# Patient Record
Sex: FEMALE | Race: WHITE | NOT HISPANIC OR LATINO | URBAN - METROPOLITAN AREA
[De-identification: names, ages, dates, MRNs, and addresses within clinical notes are randomized per-mention and may not be internally consistent; named-entity substitution may affect disease eponyms.]

---

## 2023-04-12 DIAGNOSIS — Z00.00 HEALTH MAINTENANCE EXAMINATION: ICD-10-CM

## 2023-04-12 DIAGNOSIS — E78.5 HYPERLIPIDEMIA, UNSPECIFIED: ICD-10-CM

## 2023-04-12 RX ORDER — EZETIMIBE 10 MG/1
TABLET ORAL
Qty: 90 TABLET | Refills: 3 | Status: SHIPPED | OUTPATIENT
Start: 2023-04-12 | End: 2023-12-25

## 2023-05-29 DIAGNOSIS — E78.5 HYPERLIPIDEMIA, UNSPECIFIED: ICD-10-CM

## 2023-05-29 RX ORDER — ATORVASTATIN CALCIUM 80 MG/1
TABLET, FILM COATED ORAL
Qty: 90 TABLET | Refills: 1 | Status: SHIPPED | OUTPATIENT
Start: 2023-05-29 | End: 2023-11-25

## 2023-06-28 ENCOUNTER — LAB (OUTPATIENT)
Dept: LAB | Facility: LAB | Age: 58
End: 2023-06-28
Payer: COMMERCIAL

## 2023-06-28 DIAGNOSIS — Z00.00 HEALTH MAINTENANCE EXAMINATION: ICD-10-CM

## 2023-06-28 LAB
ALANINE AMINOTRANSFERASE (SGPT) (U/L) IN SER/PLAS: 13 U/L (ref 7–45)
ALBUMIN (G/DL) IN SER/PLAS: 4.2 G/DL (ref 3.4–5)
ALKALINE PHOSPHATASE (U/L) IN SER/PLAS: 54 U/L (ref 33–110)
ANION GAP IN SER/PLAS: 14 MMOL/L (ref 10–20)
ASPARTATE AMINOTRANSFERASE (SGOT) (U/L) IN SER/PLAS: 16 U/L (ref 9–39)
BACTERIA, URINE: ABNORMAL /HPF
BASOPHILS (10*3/UL) IN BLOOD BY AUTOMATED COUNT: 0.05 X10E9/L (ref 0–0.1)
BASOPHILS/100 LEUKOCYTES IN BLOOD BY AUTOMATED COUNT: 0.8 % (ref 0–2)
BILIRUBIN TOTAL (MG/DL) IN SER/PLAS: 1.8 MG/DL (ref 0–1.2)
C REACTIVE PROTEIN (MG/L) IN SER/PLAS BY HIGH SENSIT: 3.2 MG/L
CALCIDIOL (25 OH VITAMIN D3) (NG/ML) IN SER/PLAS: 61 NG/ML
CALCIUM (MG/DL) IN SER/PLAS: 9.3 MG/DL (ref 8.6–10.6)
CARBON DIOXIDE, TOTAL (MMOL/L) IN SER/PLAS: 28 MMOL/L (ref 21–32)
CHLORIDE (MMOL/L) IN SER/PLAS: 104 MMOL/L (ref 98–107)
CHOLESTEROL (MG/DL) IN SER/PLAS: 109 MG/DL (ref 0–199)
CHOLESTEROL IN HDL (MG/DL) IN SER/PLAS: 46.3 MG/DL
CHOLESTEROL/HDL RATIO: 2.4
CREATININE (MG/DL) IN SER/PLAS: 0.61 MG/DL (ref 0.5–1.05)
EOSINOPHILS (10*3/UL) IN BLOOD BY AUTOMATED COUNT: 0.1 X10E9/L (ref 0–0.7)
EOSINOPHILS/100 LEUKOCYTES IN BLOOD BY AUTOMATED COUNT: 1.6 % (ref 0–6)
ERYTHROCYTE DISTRIBUTION WIDTH (RATIO) BY AUTOMATED COUNT: 14 % (ref 11.5–14.5)
ERYTHROCYTE MEAN CORPUSCULAR HEMOGLOBIN CONCENTRATION (G/DL) BY AUTOMATED: 31.8 G/DL (ref 32–36)
ERYTHROCYTE MEAN CORPUSCULAR VOLUME (FL) BY AUTOMATED COUNT: 87 FL (ref 80–100)
ERYTHROCYTES (10*6/UL) IN BLOOD BY AUTOMATED COUNT: 4.31 X10E12/L (ref 4–5.2)
ESTIMATED AVERAGE GLUCOSE FOR HBA1C: 120 MG/DL
GFR FEMALE: >90 ML/MIN/1.73M2
GLUCOSE (MG/DL) IN SER/PLAS: 99 MG/DL (ref 74–99)
HEMATOCRIT (%) IN BLOOD BY AUTOMATED COUNT: 37.4 % (ref 36–46)
HEMOGLOBIN (G/DL) IN BLOOD: 11.9 G/DL (ref 12–16)
HEMOGLOBIN A1C/HEMOGLOBIN TOTAL IN BLOOD: 5.8 %
IMMATURE GRANULOCYTES/100 LEUKOCYTES IN BLOOD BY AUTOMATED COUNT: 0.3 % (ref 0–0.9)
LDL: 47 MG/DL (ref 0–99)
LEUKOCYTES (10*3/UL) IN BLOOD BY AUTOMATED COUNT: 6.3 X10E9/L (ref 4.4–11.3)
LYMPHOCYTES (10*3/UL) IN BLOOD BY AUTOMATED COUNT: 2.02 X10E9/L (ref 1.2–4.8)
LYMPHOCYTES/100 LEUKOCYTES IN BLOOD BY AUTOMATED COUNT: 32.1 % (ref 13–44)
MONOCYTES (10*3/UL) IN BLOOD BY AUTOMATED COUNT: 0.35 X10E9/L (ref 0.1–1)
MONOCYTES/100 LEUKOCYTES IN BLOOD BY AUTOMATED COUNT: 5.6 % (ref 2–10)
NEUTROPHILS (10*3/UL) IN BLOOD BY AUTOMATED COUNT: 3.76 X10E9/L (ref 1.2–7.7)
NEUTROPHILS/100 LEUKOCYTES IN BLOOD BY AUTOMATED COUNT: 59.6 % (ref 40–80)
NRBC (PER 100 WBCS) BY AUTOMATED COUNT: 0 /100 WBC (ref 0–0)
PLATELETS (10*3/UL) IN BLOOD AUTOMATED COUNT: 338 X10E9/L (ref 150–450)
POTASSIUM (MMOL/L) IN SER/PLAS: 4.5 MMOL/L (ref 3.5–5.3)
PROTEIN TOTAL: 6.7 G/DL (ref 6.4–8.2)
RBC, URINE: <1 /HPF (ref 0–5)
SODIUM (MMOL/L) IN SER/PLAS: 141 MMOL/L (ref 136–145)
SQUAMOUS EPITHELIAL CELLS, URINE: 1 /HPF
THYROTROPIN (MIU/L) IN SER/PLAS BY DETECTION LIMIT <= 0.05 MIU/L: 2.03 MIU/L (ref 0.44–3.98)
TRIGLYCERIDE (MG/DL) IN SER/PLAS: 81 MG/DL (ref 0–149)
UREA NITROGEN (MG/DL) IN SER/PLAS: 12 MG/DL (ref 6–23)
VLDL: 16 MG/DL (ref 0–40)
WBC, URINE: <1 /HPF (ref 0–5)

## 2023-06-28 PROCEDURE — 36415 COLL VENOUS BLD VENIPUNCTURE: CPT

## 2023-06-28 PROCEDURE — 84443 ASSAY THYROID STIM HORMONE: CPT

## 2023-06-28 PROCEDURE — 80061 LIPID PANEL: CPT

## 2023-06-28 PROCEDURE — 80053 COMPREHEN METABOLIC PANEL: CPT

## 2023-06-28 PROCEDURE — 81001 URINALYSIS AUTO W/SCOPE: CPT

## 2023-06-28 PROCEDURE — 83036 HEMOGLOBIN GLYCOSYLATED A1C: CPT

## 2023-06-28 PROCEDURE — 86141 C-REACTIVE PROTEIN HS: CPT

## 2023-06-28 PROCEDURE — 85025 COMPLETE CBC W/AUTO DIFF WBC: CPT

## 2023-06-28 PROCEDURE — 82306 VITAMIN D 25 HYDROXY: CPT

## 2023-07-11 PROBLEM — N95.2 VAGINAL ATROPHY: Status: RESOLVED | Noted: 2023-07-11 | Resolved: 2023-07-11

## 2023-07-11 PROBLEM — N39.3 STRESS INCONTINENCE: Status: RESOLVED | Noted: 2023-07-11 | Resolved: 2023-07-11

## 2023-07-11 PROBLEM — G47.00 INSOMNIA: Status: ACTIVE | Noted: 2023-07-11

## 2023-07-11 PROBLEM — E78.5 HYPERLIPIDEMIA: Status: ACTIVE | Noted: 2023-07-11

## 2023-07-11 PROBLEM — Z00.00 ANNUAL PHYSICAL EXAM: Status: ACTIVE | Noted: 2023-07-11

## 2023-07-11 PROBLEM — R92.1 BREAST CALCIFICATION SEEN ON MAMMOGRAM: Status: RESOLVED | Noted: 2023-07-11 | Resolved: 2023-07-11

## 2023-07-11 PROBLEM — M85.80 OSTEOPENIA: Status: ACTIVE | Noted: 2023-07-11

## 2023-07-11 PROBLEM — N81.10 PROLAPSE OF ANTERIOR VAGINAL WALL: Status: RESOLVED | Noted: 2023-07-11 | Resolved: 2023-07-11

## 2023-07-11 RX ORDER — ELETRIPTAN HYDROBROMIDE 20 MG/1
20 TABLET, FILM COATED ORAL
COMMUNITY
Start: 2019-07-27

## 2023-07-11 RX ORDER — ACETAMINOPHEN 500 MG
1 TABLET ORAL DAILY
COMMUNITY

## 2023-07-11 RX ORDER — DICLOFENAC SODIUM 20 MG/G
SOLUTION TOPICAL
COMMUNITY
Start: 2017-11-02 | End: 2023-07-12 | Stop reason: ALTCHOICE

## 2023-07-11 RX ORDER — ESTRADIOL 0.04 MG/D
1 PATCH TRANSDERMAL
COMMUNITY
End: 2023-12-29

## 2023-07-11 RX ORDER — ASPIRIN 325 MG
TABLET ORAL
COMMUNITY
End: 2023-10-30

## 2023-07-11 RX ORDER — LIFITEGRAST 50 MG/ML
1 SOLUTION/ DROPS OPHTHALMIC 2 TIMES DAILY
COMMUNITY
Start: 2023-06-09 | End: 2023-12-16

## 2023-07-11 RX ORDER — ZOLPIDEM TARTRATE 5 MG/1
1 TABLET ORAL NIGHTLY PRN
COMMUNITY
Start: 2016-11-21

## 2023-07-11 NOTE — PROGRESS NOTES
Physical Exam    Name Cristiana Franz    Date of Service :7/12/2023    Cristiana Franz is a 58 y.o. year old female who is being seen for a comprehensive exam    She Has a medical history of  hypercholesteremia with elevated CT Calcium score 222.65. 6/29/2020 on Zetia, Lipitor 80 mg, asa , h/o Migraines-( was mostly Hormonal related. She hasn't had one in a while. relpax is very effective if needed )  osteopenia.   She has been on the  Estradiol patch since age 53.  s/p CHARLOTTE for uterine prolapse at age 53 doing well on estrogen also had bladder lift for severe bladder prolapse at that time. complicated by obstruction from a suture. needed second surgery  and  insomnia- uses Ambien twice weekly with good results     Her main health concerns today are .  Saw Dr. Child last month for evaluation and some of the lift has failed.    Saw Dr. Dillard for a lipoma removal on her right side.   Insomnia-  try's not to take her Ambien. Would like to try something  Hearing is not great. She is missing words. Noisier environment.    She has gained some weight. She is up 10 lbs.      Patient Active Problem List   Diagnosis    Calcification of coronary artery    Hyperlipidemia    Insomnia    Osteopenia    Annual physical exam    Decreased hearing    Pre-diabetes        Past Medical History:   Diagnosis Date    Bladder prolapse, female, acquired     Cystocele, unspecified     Migraine, unspecified, not intractable, without status migrainosus     Prolapse of anterior vaginal wall     Stress incontinence     Uterovaginal prolapse, unspecified     Vaginal atrophy         Past Surgical History:   Procedure Laterality Date    OTHER SURGICAL HISTORY  03/03/2015    Hysteroscopy W/ Insert Of Device To Occlude Fallopian Tubes    OTHER SURGICAL HISTORY  03/19/2019    Hysterectomy supracervical        Social History     Tobacco Use    Smoking status: Never    Smokeless tobacco: Never      Social History     Social History Narrative    , mother  "of 3, works as a homemaker, lives in [place], [place].     Active, walks regularly tries to do the 10,000 steps a day. life long nonsmoker, social ETOH     two daughters and a son     Alcohol: rarely   Diet: balanced.    Exercise: walks, and does pure.     Family History   Problem Relation Name Age of Onset    Coronary artery disease Mother          d. 75    Kidney failure Mother      Coronary artery disease Father  60 - 69        d. 87    Parkinsonism Father      Hypertension Sister      Skin cancer Sister      No Known Problems Sister      Other (brain turmor) Father's Brother      COPD Maternal Grandmother          d. 82    Stroke Maternal Grandfather  60 - 69    No Known Problems Other          Current Outpatient Medications:     eletriptan (Relpax) 20 mg tablet, Take 1 tablet (20 mg) by mouth., Disp: , Rfl:     Xiidra 5 % dropperette, 1 drop twice a day., Disp: , Rfl:     zolpidem (Ambien) 5 mg tablet, Take 1 tablet (5 mg) by mouth as needed at bedtime., Disp: , Rfl:     aspirin 325 mg tablet, Take by mouth., Disp: , Rfl:     atorvastatin (Lipitor) 80 mg tablet, TAKE 1 TABLET BY MOUTH EVERY DAY, Disp: 90 tablet, Rfl: 1    cholecalciferol (Vitamin D-3) 50 mcg (2,000 unit) capsule, Take 1 capsule (50 mcg) by mouth once daily., Disp: , Rfl:     estradiol (Climara) 0.0375 mg/24 hr patch, Place 1 patch on the skin 1 (one) time per week. apply as directed, Disp: , Rfl:     ezetimibe (Zetia) 10 mg tablet, TAKE 1 TABLET BY MOUTH EVERY DAY, Disp: 90 tablet, Rfl: 3    metFORMIN (Glucophage) 500 mg tablet, Take 1 tablet (500 mg) by mouth 2 times a day with meals., Disp: 60 tablet, Rfl: 1    traZODone (Desyrel) 50 mg tablet, Take 1 tablet (50 mg) by mouth as needed at bedtime for sleep., Disp: 30 tablet, Rfl: 0    Allergies   Allergen Reactions    Penicillins Itching, Rash and Unknown     /72 (BP Location: Left arm, Patient Position: Sitting)   Pulse 72   Temp 36.7 °C (98.1 °F)   Ht 1.575 m (5' 2\")   Wt 67.1 kg " (148 lb)   SpO2 95%   BMI 27.07 kg/m²  Body mass index is 27.07 kg/m².    Physical Exam  Constitutional:       General: She is not in acute distress.     Appearance: Normal appearance. She is not ill-appearing.   HENT:      Right Ear: Tympanic membrane and ear canal normal.      Left Ear: Tympanic membrane and ear canal normal.   Eyes:      Extraocular Movements: Extraocular movements intact.      Conjunctiva/sclera: Conjunctivae normal.   Cardiovascular:      Rate and Rhythm: Normal rate.      Pulses: Normal pulses.      Heart sounds: Normal heart sounds. No murmur heard.  Pulmonary:      Effort: Pulmonary effort is normal. No respiratory distress.      Breath sounds: Normal breath sounds. No wheezing, rhonchi or rales.   Chest:   Breasts:     Right: Normal. No mass, nipple discharge or skin change.      Left: No mass, nipple discharge or skin change.   Abdominal:      General: Bowel sounds are normal. There is no distension.      Palpations: There is no mass.      Tenderness: There is no abdominal tenderness. There is no guarding.   Musculoskeletal:         General: Normal range of motion.   Lymphadenopathy:      Cervical: No cervical adenopathy.      Upper Body:      Right upper body: No supraclavicular or axillary adenopathy.      Left upper body: No supraclavicular or axillary adenopathy.      Lower Body: No right inguinal adenopathy. No left inguinal adenopathy.   Skin:     General: Skin is warm and dry.      Findings: No rash.      Comments: No suspicious rashes or lesions  2 lipomas on her back  Right upper 4.5 x4 cm  Right lower 7 cm x 7.5 cm    Neurological:      General: No focal deficit present.      Mental Status: She is alert.   Psychiatric:         Mood and Affect: Mood normal.         RESULTS/DATA:  Reviewed Standard Labs for this physical with patient ( any significant issues addressed in A/P )     ECG: NSR with RSR in V1 no change from prior       Assessment/Plan    see patient summary      Problem List Items Addressed This Visit       Hyperlipidemia     At goal   Continue Lipitor, Zeita and ASA daily         Insomnia    Relevant Medications    traZODone (Desyrel) 50 mg tablet    Osteopenia    Relevant Orders    XR DEXA bone density    Annual physical exam - Primary     Routine  Pap- 3/19/2019 negative, HPV negative. next pap due 2024. cervix still present   colonoscopy 2/25/2016 repeat 10 years 2/25/2026  DEXA: 12/14/21 lowest T score -1.2 left femoral neck  mammogram- 7/25/22  right breast calcifications.  6 month follow up in Jan. 2023  persistent. Recommend another 6 month follow up         Decreased hearing    Relevant Orders    Referral to Audiology    Pre-diabetes    Relevant Medications    metFORMIN (Glucophage) 500 mg tablet     Other Visit Diagnoses       Breast calcification, right        Relevant Orders    BI mammo bilateral diagnostic    Breast screening        Relevant Orders    BI mammo bilateral diagnostic            ROUTINE:     She will follow up with Dr. Child in November for Pap and to discuss weaning off HRT  OPHTHALMOLOGY:current   DERMATOLOGY: Dr. Gill  DENTISTRY: current       Reina Yo MD

## 2023-07-11 NOTE — ASSESSMENT & PLAN NOTE
Routine  Pap- 3/19/2019 negative, HPV negative. next pap due 2024. cervix still present   colonoscopy 2/25/2016 repeat 10 years 2/25/2026  DEXA: 12/14/21 lowest T score -1.2 left femoral neck  mammogram- 7/25/22  right breast calcifications.  6 month follow up in Jan. 2023  persistent. Recommend another 6 month follow up

## 2023-07-12 ENCOUNTER — OFFICE VISIT (OUTPATIENT)
Dept: PRIMARY CARE | Facility: CLINIC | Age: 58
End: 2023-07-12
Payer: COMMERCIAL

## 2023-07-12 VITALS
HEART RATE: 72 BPM | OXYGEN SATURATION: 95 % | SYSTOLIC BLOOD PRESSURE: 106 MMHG | WEIGHT: 148 LBS | HEIGHT: 62 IN | TEMPERATURE: 98.1 F | DIASTOLIC BLOOD PRESSURE: 72 MMHG | BODY MASS INDEX: 27.23 KG/M2

## 2023-07-12 DIAGNOSIS — R73.03 PRE-DIABETES: ICD-10-CM

## 2023-07-12 DIAGNOSIS — E78.5 HYPERLIPIDEMIA, UNSPECIFIED HYPERLIPIDEMIA TYPE: ICD-10-CM

## 2023-07-12 DIAGNOSIS — F51.01 PRIMARY INSOMNIA: ICD-10-CM

## 2023-07-12 DIAGNOSIS — Z00.00 ANNUAL PHYSICAL EXAM: Primary | ICD-10-CM

## 2023-07-12 DIAGNOSIS — R92.8 OTHER ABNORMAL AND INCONCLUSIVE FINDINGS ON DIAGNOSTIC IMAGING OF BREAST: ICD-10-CM

## 2023-07-12 DIAGNOSIS — R92.1 BREAST CALCIFICATION, RIGHT: ICD-10-CM

## 2023-07-12 DIAGNOSIS — Z12.39 BREAST SCREENING: ICD-10-CM

## 2023-07-12 DIAGNOSIS — M85.852 OSTEOPENIA OF LEFT HIP: ICD-10-CM

## 2023-07-12 DIAGNOSIS — H91.90 DECREASED HEARING, UNSPECIFIED LATERALITY: ICD-10-CM

## 2023-07-12 PROCEDURE — UHSPHYS PR UH SELECT PHYSICAL: Performed by: INTERNAL MEDICINE

## 2023-07-12 PROCEDURE — 1036F TOBACCO NON-USER: CPT | Performed by: INTERNAL MEDICINE

## 2023-07-12 PROCEDURE — 93000 ELECTROCARDIOGRAM COMPLETE: CPT | Performed by: INTERNAL MEDICINE

## 2023-07-12 RX ORDER — TRAZODONE HYDROCHLORIDE 50 MG/1
50 TABLET ORAL NIGHTLY PRN
Qty: 30 TABLET | Refills: 0 | Status: SHIPPED | OUTPATIENT
Start: 2023-07-12 | End: 2023-08-04

## 2023-07-12 RX ORDER — METFORMIN HYDROCHLORIDE 500 MG/1
500 TABLET ORAL
Qty: 60 TABLET | Refills: 1 | Status: SHIPPED | OUTPATIENT
Start: 2023-07-12 | End: 2023-08-04

## 2023-07-12 NOTE — PATIENT INSTRUCTIONS
Start Metformin with dinner for a week and if you tolerate it got to twice daily with breakfast and dinner   Try to watch your snacking and portions.  Continue to make healthy food choices  you should aim to follow the DASH diet. The DASH diet is a combination diet rich in fruits, vegetables, legumes, and low-fat dairy products and low in snacks, sweets, meats, and saturated and total fat. The DASH diet is comprised of four to five servings of fruit, four to five servings of vegetables, two to three servings of low-fat dairy per day, and <25 percent fat.    Arrange to get your Covid Booster at the pharmacy  Update your mammogram and bone density.  Orders placed   An order to get a baseline hearing test was placed   You should discuss with Dr. Eller at your next visit, weaning off your hormone replacement and replacing it with vaginal estrogen  Trial of trazodone 50 mg to 75 mg for sleep.  ( 1 to 1/2 tablets)     Recheck fasting labs in 6 months after starting the Metformin

## 2023-07-19 ENCOUNTER — TELEPHONE (OUTPATIENT)
Dept: PRIMARY CARE | Facility: CLINIC | Age: 58
End: 2023-07-19
Payer: COMMERCIAL

## 2023-08-04 DIAGNOSIS — R73.03 PRE-DIABETES: ICD-10-CM

## 2023-08-04 DIAGNOSIS — F51.01 PRIMARY INSOMNIA: ICD-10-CM

## 2023-08-04 RX ORDER — TRAZODONE HYDROCHLORIDE 50 MG/1
50 TABLET ORAL NIGHTLY PRN
Qty: 30 TABLET | Refills: 0 | Status: SHIPPED | OUTPATIENT
Start: 2023-08-04 | End: 2023-08-31

## 2023-08-04 RX ORDER — METFORMIN HYDROCHLORIDE 500 MG/1
500 TABLET ORAL
Qty: 180 TABLET | Refills: 1 | Status: SHIPPED | OUTPATIENT
Start: 2023-08-04 | End: 2023-10-30 | Stop reason: SINTOL

## 2023-08-28 ENCOUNTER — TELEPHONE (OUTPATIENT)
Dept: PRIMARY CARE | Facility: CLINIC | Age: 58
End: 2023-08-28
Payer: COMMERCIAL

## 2023-08-28 NOTE — TELEPHONE ENCOUNTER
Rafael Zendejas,  You can just stop it.  As for the Covid booster, I would wait for another month or two to see what happens with the new booster or not.  It may not be ready by the fall in which case you should just get the one available.     Reina Yo MD  [facility]  [address] [place] [address]  Phone [phone]  Fax [phone]      From: CRISTIANA HARRISON <[email or web address]>   Sent: Monday, August 28, 2023 8:18 AM  To: Reina Yo [email or web address]  Subject: Medicine      Hi Dr Yo,  Quick question… I'm taking Metaformin once a day. It is bothering my stomach. Can I stop it or do I need to ween off it?  Also, should I wait to get the new covid shot or get one now?   Thanks so much,  Cristiana       Thank You   Cristiana Harrison  [phone]

## 2023-08-31 DIAGNOSIS — F51.01 PRIMARY INSOMNIA: ICD-10-CM

## 2023-08-31 RX ORDER — TRAZODONE HYDROCHLORIDE 50 MG/1
50 TABLET ORAL NIGHTLY PRN
Qty: 90 TABLET | Refills: 3 | Status: SHIPPED | OUTPATIENT
Start: 2023-08-31 | End: 2024-08-30

## 2023-10-05 ENCOUNTER — PREP FOR PROCEDURE (OUTPATIENT)
Dept: SURGERY | Facility: HOSPITAL | Age: 58
End: 2023-10-05
Payer: COMMERCIAL

## 2023-10-05 DIAGNOSIS — D17.1 LIPOMA OF BACK: Primary | ICD-10-CM

## 2023-10-05 RX ORDER — SODIUM CHLORIDE, SODIUM LACTATE, POTASSIUM CHLORIDE, CALCIUM CHLORIDE 600; 310; 30; 20 MG/100ML; MG/100ML; MG/100ML; MG/100ML
100 INJECTION, SOLUTION INTRAVENOUS CONTINUOUS
Status: CANCELLED | OUTPATIENT
Start: 2023-10-05

## 2023-10-30 RX ORDER — ASPIRIN 81 MG/1
81 TABLET ORAL DAILY
COMMUNITY

## 2023-10-30 NOTE — PREPROCEDURE INSTRUCTIONS
Pre-Op Instructions & Checklist  Your surgery has been scheduled at [facility] at [address] [place], [address], [address], in the [facility]. Parking is to the left of the main entrance.  You will be contacted about the time of your surgery the day before your surgery. If you are unable to answer the phone, a detailed voicemail message will be left. Make sure that your voicemail box is not full so a message can be left. If you have not received a call by 3:00 pm call [phone]. Please be available by phone the night before/day of surgery in case there is a change in the schedule which may require you to arrive earlier/later.  14 DAYS BEFORE SURGERY STOP TAKING WEIGHT LOSS MEDICATIONS     7 DAYS BEFORE SURGERY STOP THESE MEDICATIONS:  Multiple Vitamins containing Vitamin E  Herbal supplements, Fish Oil, garlic pills, turmeric  Stop taking aspirin, and aspirin-containing products as well as NSAID's such as Advil, Motrin, Aleve, Ibuprofen. Tylenol is okay to take for pain relief.   If you are currently taking Coumadin/Warfarin, we will have to coordinate that with your PCP &/or the Anticoagulation Clinic.     THE DAY BEFORE SURGERY:  *Do not eat any food after midnight the night before surgery.   *You are permitted to have clear liquids such as water, apple juice, plain tea or coffee (no milk or creamer), clear electrolyte-replenishing drinks such as Pedialyte, Gatorade, or Powerade (not yogurt or pulp-containing smoothies or juices such as orange juice) up to 2 hours before your surgery.    DAY OF SURGERY, TAKE THESE MEDICATIONS with a small sip of water (if it is not listed, do not take it):    There are no medications for you to take the morning of surgery.    ON THE MORNING OF SURGERY:  *Shower either the night before your surgery or the morning of your surgery  *Do not use moisturizers, creams, lotions or perfume, or make-up.  *Wear comfortable, loose fitting clothing.    *All jewelry and valuables should be left at home.  *Prosthetic devices such as contact lenses, hearing aids, dentures, eyelash extensions, hairpins and body piercing must be removed before surgery. Bring containers for eyeglasses/contacts, dentures, or hearing aids with you.  Diabetics: Please check fasting blood sugars upon waking up.  If fasting blood sugars are<80ml/dl, please drink 3 ounces of apple juice no later than 2 hours prior to surgery.    BRING WITH YOU:  *Photo ID and insurance card  *Current list of medicines and allergies  *Pacemaker/Defibrillator/Heart stent cards  *Copy of your complete Advanced Directive/DHPOA-if applicable        SMOKING:  *Quitting smoking can make a huge difference to your health and recovery from surgery.    *If you need help with quitting, call [phone].    Alcohol:  *No alcoholic beverages for 48 hours before surgery.      AFTER OUTPATIENT SURGERY:  *A responsible adult MUST accompany you at the time of discharge and stay with you for 24 hours after your surgery.  *You may NOT drive yourself home after surgery.  *You may use a taxi or ride sharing service (Lyft, Uber) to return home ONLY if you are accompanied by a friend or family member.  *Instructions for resuming your medications will be provided by your surgeon.    CONTACT SURGEON'S OFFICE IF YOU DEVELOP:  * Fever =/> 100.4 F   * New respiratory symptoms (e.g. cough, shortness of breath, respiratory distress, sore throat)  * Recent loss of taste or smell  *Flu like symptoms such as headache, fatigue or gastrointestinal symptoms  * If you develop any open sores, shingles, burning or painful urination   AND/OR:  * You no longer wish to have the surgery.  * Any other personal circumstances change that may lead to the need to cancel or defer this surgery.  *You were admitted to any hospital within one week of your planned procedure.      If you have any questions regarding these preoperative instructions you may  call [phone]. If you have questions regarding you surgical procedure, or post-operative care/recovery please call your surgeon's office.

## 2023-10-30 NOTE — CPM/PAT H&P
3CPM/PAT Evaluation       Name: Cristiana Franz (Cristiana Franz)  /Age: 1965/58 y.o.     Telemedicine Encounter  Patient was contacted by telephone for preadmission testing perioperative risk assessment in anticipation of excision of back lesion on 2023 at [facility]    Chief Complaint  Lipoma of trunk    HPI  Patient's complaining of a 10-year history of a large lipoma located on the right side of her mid back that has been gradually getting larger over time.  Patient denies any associated pain but states it can be seen through some of her clothing and is getting more bothersome.  Patient wants to have the lipoma surgically removed.      Active Problems  Patient Active Problem List   Diagnosis    Calcification of coronary artery    Hyperlipidemia    Insomnia    Osteopenia    Annual physical exam    Decreased hearing    Pre-diabetes    Lipoma of back     Past Medical History  Past Medical History:   Diagnosis Date    Bladder prolapse, female, acquired     Cystocele, unspecified     Lipoma of skin and subcutaneous tissue of trunk     Migraine, unspecified, not intractable, without status migrainosus     Prolapse of anterior vaginal wall     Stress incontinence     Uterovaginal prolapse, unspecified     Vaginal atrophy      Surgical History  Past Surgical History:   Procedure Laterality Date    BLADDER SUSPENSION      COLONOSCOPY      OTHER SURGICAL HISTORY  2015    Hysteroscopy W/ Insert Of Device To Occlude Fallopian Tubes    OTHER SURGICAL HISTORY  2019    Hysterectomy supracervical     Anesthesia History  Denies problems with anesthesia in the past such as PONV, prolonged sedation, awareness, dental damage, aspiration, cardiac arrest, difficult intubation, or unexpected hospital admissions.    Denies family history of malignant hyperthermia, or pseudocholinesterase deficiency.    Social History  , mother of 3; lives with her .  Never smoker; EtOH: Rare use; denies  medical marijuana, recreational drug use.  Patient states she is very physically active and goes for 4 to 5 mile walks several times a week, and exercises 3-4 times a week.  Patient states she is able to do moderate ADLs such as heavy housework, light yard work.  Patient denies chest pain, MUSE.  METS >4    Family History  Family History   Problem Relation Name Age of Onset    Coronary artery disease Mother          d. 75    Kidney failure Mother      Coronary artery disease Father  60 - 69        d. 87    Parkinsonism Father      Hypertension Sister      Skin cancer Sister      No Known Problems Sister      Other (brain turmor) Father's Brother      COPD Maternal Grandmother          d. 82    Stroke Maternal Grandfather  60 - 69    No Known Problems Other       Allergies  Allergies   Allergen Reactions    Penicillins Itching, Rash and Unknown     Medications  No current facility-administered medications for this encounter.    Current Outpatient Medications:     aspirin 81 mg EC tablet, Take 1 tablet (81 mg) by mouth once daily., Disp: , Rfl:     atorvastatin (Lipitor) 80 mg tablet, TAKE 1 TABLET BY MOUTH EVERY DAY, Disp: 90 tablet, Rfl: 1    cholecalciferol (Vitamin D-3) 50 mcg (2,000 unit) capsule, Take 1 capsule (50 mcg) by mouth once daily., Disp: , Rfl:     eletriptan (Relpax) 20 mg tablet, Take 1 tablet (20 mg) by mouth., Disp: , Rfl:     estradiol (Climara) 0.0375 mg/24 hr patch, Place 1 patch on the skin 1 (one) time per week. apply as directed, Disp: , Rfl:     ezetimibe (Zetia) 10 mg tablet, TAKE 1 TABLET BY MOUTH EVERY DAY, Disp: 90 tablet, Rfl: 3    traZODone (Desyrel) 50 mg tablet, Take 1 tablet (50 mg) by mouth as needed at bedtime for sleep., Disp: 90 tablet, Rfl: 3    Xiidra 5 % dropperette, 1 drop twice a day., Disp: , Rfl:     zolpidem (Ambien) 5 mg tablet, Take 1 tablet (5 mg) by mouth as needed at bedtime., Disp: , Rfl:     Physical Exam  Deferred    Airway Exam  Deferred    Vitals  No vitals  taken for telemedicine visit  Height: 5 feet 2 inches; weight: 148 pounds; BMI: 27.7    Labs  Lab Results   Component Value Date    WBC 6.3 06/28/2023    HGB 11.9 (L) 06/28/2023    HCT 37.4 06/28/2023    MCV 87 06/28/2023     06/28/2023      Lab Results   Component Value Date    GLUCOSE 99 06/28/2023    CALCIUM 9.3 06/28/2023     06/28/2023    K 4.5 06/28/2023    CO2 28 06/28/2023     06/28/2023    BUN 12 06/28/2023    CREATININE 0.61 06/28/2023     Lab Results   Component Value Date    HGBA1C 5.8 (A) 06/28/2023     Imaging     Encounter Date: 07/12/23   ECG 12 lead (Clinic Performed)    Narrative    NSR with RSR in V1. No change from prior      Exercise stress test from 07/09/2020  Summary:   1. The resting ejection fraction was estimated at 55 to 60% with a peak exercise ejection fraction estimated at 65 to 70%.   2. Good functional capacity 10 METS.   3. No echocardiographic or electrocardiographic evidence for ischemia at a maximal workload.   4. The adequate level of stress was achieved.    Elevated cardiac calcium score (; CX 11; RCA 3)    Assessment/Plan  Lipoma of trunk  Excision of back lesion

## 2023-10-30 NOTE — H&P (VIEW-ONLY)
3CPM/PAT Evaluation       Name: Cristiana Franz (Cristiana Franz)  /Age: 1965/58 y.o.     Telemedicine Encounter  Patient was contacted by telephone for preadmission testing perioperative risk assessment in anticipation of excision of back lesion on 2023 at [facility]    Chief Complaint  Lipoma of trunk    HPI  Patient's complaining of a 10-year history of a large lipoma located on the right side of her mid back that has been gradually getting larger over time.  Patient denies any associated pain but states it can be seen through some of her clothing and is getting more bothersome.  Patient wants to have the lipoma surgically removed.      Active Problems  Patient Active Problem List   Diagnosis    Calcification of coronary artery    Hyperlipidemia    Insomnia    Osteopenia    Annual physical exam    Decreased hearing    Pre-diabetes    Lipoma of back     Past Medical History  Past Medical History:   Diagnosis Date    Bladder prolapse, female, acquired     Cystocele, unspecified     Lipoma of skin and subcutaneous tissue of trunk     Migraine, unspecified, not intractable, without status migrainosus     Prolapse of anterior vaginal wall     Stress incontinence     Uterovaginal prolapse, unspecified     Vaginal atrophy      Surgical History  Past Surgical History:   Procedure Laterality Date    BLADDER SUSPENSION      COLONOSCOPY      OTHER SURGICAL HISTORY  2015    Hysteroscopy W/ Insert Of Device To Occlude Fallopian Tubes    OTHER SURGICAL HISTORY  2019    Hysterectomy supracervical     Anesthesia History  Denies problems with anesthesia in the past such as PONV, prolonged sedation, awareness, dental damage, aspiration, cardiac arrest, difficult intubation, or unexpected hospital admissions.    Denies family history of malignant hyperthermia, or pseudocholinesterase deficiency.    Social History  , mother of 3; lives with her .  Never smoker; EtOH: Rare use; denies  medical marijuana, recreational drug use.  Patient states she is very physically active and goes for 4 to 5 mile walks several times a week, and exercises 3-4 times a week.  Patient states she is able to do moderate ADLs such as heavy housework, light yard work.  Patient denies chest pain, MUSE.  METS >4    Family History  Family History   Problem Relation Name Age of Onset    Coronary artery disease Mother          d. 75    Kidney failure Mother      Coronary artery disease Father  60 - 69        d. 87    Parkinsonism Father      Hypertension Sister      Skin cancer Sister      No Known Problems Sister      Other (brain turmor) Father's Brother      COPD Maternal Grandmother          d. 82    Stroke Maternal Grandfather  60 - 69    No Known Problems Other       Allergies  Allergies   Allergen Reactions    Penicillins Itching, Rash and Unknown     Medications  No current facility-administered medications for this encounter.    Current Outpatient Medications:     aspirin 81 mg EC tablet, Take 1 tablet (81 mg) by mouth once daily., Disp: , Rfl:     atorvastatin (Lipitor) 80 mg tablet, TAKE 1 TABLET BY MOUTH EVERY DAY, Disp: 90 tablet, Rfl: 1    cholecalciferol (Vitamin D-3) 50 mcg (2,000 unit) capsule, Take 1 capsule (50 mcg) by mouth once daily., Disp: , Rfl:     eletriptan (Relpax) 20 mg tablet, Take 1 tablet (20 mg) by mouth., Disp: , Rfl:     estradiol (Climara) 0.0375 mg/24 hr patch, Place 1 patch on the skin 1 (one) time per week. apply as directed, Disp: , Rfl:     ezetimibe (Zetia) 10 mg tablet, TAKE 1 TABLET BY MOUTH EVERY DAY, Disp: 90 tablet, Rfl: 3    traZODone (Desyrel) 50 mg tablet, Take 1 tablet (50 mg) by mouth as needed at bedtime for sleep., Disp: 90 tablet, Rfl: 3    Xiidra 5 % dropperette, 1 drop twice a day., Disp: , Rfl:     zolpidem (Ambien) 5 mg tablet, Take 1 tablet (5 mg) by mouth as needed at bedtime., Disp: , Rfl:     Physical Exam  Deferred    Airway Exam  Deferred    Vitals  No vitals  taken for telemedicine visit  Height: 5 feet 2 inches; weight: 148 pounds; BMI: 27.7    Labs  Lab Results   Component Value Date    WBC 6.3 06/28/2023    HGB 11.9 (L) 06/28/2023    HCT 37.4 06/28/2023    MCV 87 06/28/2023     06/28/2023      Lab Results   Component Value Date    GLUCOSE 99 06/28/2023    CALCIUM 9.3 06/28/2023     06/28/2023    K 4.5 06/28/2023    CO2 28 06/28/2023     06/28/2023    BUN 12 06/28/2023    CREATININE 0.61 06/28/2023     Lab Results   Component Value Date    HGBA1C 5.8 (A) 06/28/2023     Imaging     Encounter Date: 07/12/23   ECG 12 lead (Clinic Performed)    Narrative    NSR with RSR in V1. No change from prior      Exercise stress test from 07/09/2020  Summary:   1. The resting ejection fraction was estimated at 55 to 60% with a peak exercise ejection fraction estimated at 65 to 70%.   2. Good functional capacity 10 METS.   3. No echocardiographic or electrocardiographic evidence for ischemia at a maximal workload.   4. The adequate level of stress was achieved.    Elevated cardiac calcium score (; CX 11; RCA 3)    Assessment/Plan  Lipoma of trunk  Excision of back lesion

## 2023-11-07 ENCOUNTER — ANESTHESIA EVENT (OUTPATIENT)
Dept: OPERATING ROOM | Facility: CLINIC | Age: 58
End: 2023-11-07
Payer: COMMERCIAL

## 2023-11-07 RX ORDER — OXYCODONE HYDROCHLORIDE 10 MG/1
10 TABLET ORAL EVERY 4 HOURS PRN
Status: CANCELLED | OUTPATIENT
Start: 2023-11-07

## 2023-11-07 RX ORDER — ACETAMINOPHEN 325 MG/1
650 TABLET ORAL EVERY 4 HOURS PRN
Status: CANCELLED | OUTPATIENT
Start: 2023-11-07

## 2023-11-07 RX ORDER — ONDANSETRON HYDROCHLORIDE 2 MG/ML
4 INJECTION, SOLUTION INTRAVENOUS ONCE AS NEEDED
Status: CANCELLED | OUTPATIENT
Start: 2023-11-07

## 2023-11-07 RX ORDER — ALBUTEROL SULFATE 0.83 MG/ML
2.5 SOLUTION RESPIRATORY (INHALATION) ONCE AS NEEDED
Status: CANCELLED | OUTPATIENT
Start: 2023-11-07

## 2023-11-07 RX ORDER — FENTANYL CITRATE 50 UG/ML
25 INJECTION, SOLUTION INTRAMUSCULAR; INTRAVENOUS EVERY 5 MIN PRN
Status: CANCELLED | OUTPATIENT
Start: 2023-11-07

## 2023-11-07 RX ORDER — DIPHENHYDRAMINE HYDROCHLORIDE 50 MG/ML
12.5 INJECTION INTRAMUSCULAR; INTRAVENOUS ONCE AS NEEDED
Status: CANCELLED | OUTPATIENT
Start: 2023-11-07

## 2023-11-07 RX ORDER — OXYCODONE HYDROCHLORIDE 5 MG/1
5 TABLET ORAL EVERY 4 HOURS PRN
Status: CANCELLED | OUTPATIENT
Start: 2023-11-07

## 2023-11-07 RX ORDER — FENTANYL CITRATE 50 UG/ML
50 INJECTION, SOLUTION INTRAMUSCULAR; INTRAVENOUS EVERY 5 MIN PRN
Status: CANCELLED | OUTPATIENT
Start: 2023-11-07

## 2023-11-08 ENCOUNTER — ANESTHESIA (OUTPATIENT)
Dept: OPERATING ROOM | Facility: CLINIC | Age: 58
End: 2023-11-08
Payer: COMMERCIAL

## 2023-11-08 ENCOUNTER — HOSPITAL ENCOUNTER (OUTPATIENT)
Facility: CLINIC | Age: 58
Setting detail: OUTPATIENT SURGERY
Discharge: HOME | End: 2023-11-08
Attending: SURGERY | Admitting: SURGERY
Payer: COMMERCIAL

## 2023-11-08 VITALS
DIASTOLIC BLOOD PRESSURE: 62 MMHG | WEIGHT: 147.93 LBS | HEIGHT: 62 IN | RESPIRATION RATE: 16 BRPM | BODY MASS INDEX: 27.22 KG/M2 | OXYGEN SATURATION: 98 % | TEMPERATURE: 97.2 F | HEART RATE: 68 BPM | SYSTOLIC BLOOD PRESSURE: 93 MMHG

## 2023-11-08 DIAGNOSIS — D17.1 LIPOMA OF BACK: Primary | ICD-10-CM

## 2023-11-08 PROBLEM — R11.2 PONV (POSTOPERATIVE NAUSEA AND VOMITING): Status: ACTIVE | Noted: 2023-11-08

## 2023-11-08 PROBLEM — Z98.890 PONV (POSTOPERATIVE NAUSEA AND VOMITING): Status: ACTIVE | Noted: 2023-11-08

## 2023-11-08 PROCEDURE — A11403 PR EXC SKIN BENIG 2.1-3 CM TRUNK,ARM,LEG: Performed by: ANESTHESIOLOGY

## 2023-11-08 PROCEDURE — 7100000009 HC PHASE TWO TIME - INITIAL BASE CHARGE: Performed by: SURGERY

## 2023-11-08 PROCEDURE — 3600000008 HC OR TIME - EACH INCREMENTAL 1 MINUTE - PROCEDURE LEVEL THREE: Performed by: SURGERY

## 2023-11-08 PROCEDURE — 2500000005 HC RX 250 GENERAL PHARMACY W/O HCPCS

## 2023-11-08 PROCEDURE — 3700000001 HC GENERAL ANESTHESIA TIME - INITIAL BASE CHARGE: Performed by: SURGERY

## 2023-11-08 PROCEDURE — 88304 TISSUE EXAM BY PATHOLOGIST: CPT | Performed by: PATHOLOGY

## 2023-11-08 PROCEDURE — 3600000003 HC OR TIME - INITIAL BASE CHARGE - PROCEDURE LEVEL THREE: Performed by: SURGERY

## 2023-11-08 PROCEDURE — 2500000004 HC RX 250 GENERAL PHARMACY W/ HCPCS (ALT 636 FOR OP/ED): Performed by: STUDENT IN AN ORGANIZED HEALTH CARE EDUCATION/TRAINING PROGRAM

## 2023-11-08 PROCEDURE — 2500000004 HC RX 250 GENERAL PHARMACY W/ HCPCS (ALT 636 FOR OP/ED)

## 2023-11-08 PROCEDURE — 2500000004 HC RX 250 GENERAL PHARMACY W/ HCPCS (ALT 636 FOR OP/ED): Performed by: SURGERY

## 2023-11-08 PROCEDURE — 94760 N-INVAS EAR/PLS OXIMETRY 1: CPT

## 2023-11-08 PROCEDURE — 88304 TISSUE EXAM BY PATHOLOGIST: CPT | Mod: TC | Performed by: SURGERY

## 2023-11-08 PROCEDURE — A11403 PR EXC SKIN BENIG 2.1-3 CM TRUNK,ARM,LEG

## 2023-11-08 PROCEDURE — 21931 EXC BACK LES SC 3 CM/>: CPT | Performed by: SURGERY

## 2023-11-08 PROCEDURE — 2720000007 HC OR 272 NO HCPCS: Performed by: SURGERY

## 2023-11-08 PROCEDURE — 3700000002 HC GENERAL ANESTHESIA TIME - EACH INCREMENTAL 1 MINUTE: Performed by: SURGERY

## 2023-11-08 PROCEDURE — 7100000010 HC PHASE TWO TIME - EACH INCREMENTAL 1 MINUTE: Performed by: SURGERY

## 2023-11-08 PROCEDURE — 2500000005 HC RX 250 GENERAL PHARMACY W/O HCPCS: Performed by: SURGERY

## 2023-11-08 RX ORDER — SODIUM CHLORIDE, SODIUM LACTATE, POTASSIUM CHLORIDE, CALCIUM CHLORIDE 600; 310; 30; 20 MG/100ML; MG/100ML; MG/100ML; MG/100ML
100 INJECTION, SOLUTION INTRAVENOUS CONTINUOUS
Status: DISCONTINUED | OUTPATIENT
Start: 2023-11-08 | End: 2023-11-08 | Stop reason: HOSPADM

## 2023-11-08 RX ORDER — FENTANYL CITRATE 50 UG/ML
INJECTION, SOLUTION INTRAMUSCULAR; INTRAVENOUS AS NEEDED
Status: DISCONTINUED | OUTPATIENT
Start: 2023-11-08 | End: 2023-11-08

## 2023-11-08 RX ORDER — ONDANSETRON HYDROCHLORIDE 2 MG/ML
INJECTION, SOLUTION INTRAVENOUS AS NEEDED
Status: DISCONTINUED | OUTPATIENT
Start: 2023-11-08 | End: 2023-11-08

## 2023-11-08 RX ORDER — BUPIVACAINE HYDROCHLORIDE 5 MG/ML
INJECTION, SOLUTION PERINEURAL AS NEEDED
Status: DISCONTINUED | OUTPATIENT
Start: 2023-11-08 | End: 2023-11-08 | Stop reason: HOSPADM

## 2023-11-08 RX ORDER — LIDOCAINE HYDROCHLORIDE 20 MG/ML
INJECTION, SOLUTION INFILTRATION; PERINEURAL AS NEEDED
Status: DISCONTINUED | OUTPATIENT
Start: 2023-11-08 | End: 2023-11-08

## 2023-11-08 RX ORDER — IBUPROFEN 600 MG/1
600 TABLET ORAL EVERY 6 HOURS PRN
Qty: 20 TABLET | Refills: 0 | Status: SHIPPED | OUTPATIENT
Start: 2023-11-08

## 2023-11-08 RX ORDER — TRIAMCINOLONE ACETONIDE 40 MG/ML
INJECTION, SUSPENSION INTRA-ARTICULAR; INTRAMUSCULAR AS NEEDED
Status: DISCONTINUED | OUTPATIENT
Start: 2023-11-08 | End: 2023-11-08 | Stop reason: HOSPADM

## 2023-11-08 RX ORDER — CEFAZOLIN 1 G/1
INJECTION, POWDER, FOR SOLUTION INTRAVENOUS AS NEEDED
Status: DISCONTINUED | OUTPATIENT
Start: 2023-11-08 | End: 2023-11-08

## 2023-11-08 RX ORDER — LIDOCAINE HYDROCHLORIDE 10 MG/ML
INJECTION INFILTRATION; PERINEURAL AS NEEDED
Status: DISCONTINUED | OUTPATIENT
Start: 2023-11-08 | End: 2023-11-08 | Stop reason: HOSPADM

## 2023-11-08 RX ORDER — MIDAZOLAM HYDROCHLORIDE 1 MG/ML
INJECTION, SOLUTION INTRAMUSCULAR; INTRAVENOUS AS NEEDED
Status: DISCONTINUED | OUTPATIENT
Start: 2023-11-08 | End: 2023-11-08

## 2023-11-08 RX ORDER — PROPOFOL 10 MG/ML
INJECTION, EMULSION INTRAVENOUS CONTINUOUS PRN
Status: DISCONTINUED | OUTPATIENT
Start: 2023-11-08 | End: 2023-11-08

## 2023-11-08 RX ORDER — PROPOFOL 10 MG/ML
INJECTION, EMULSION INTRAVENOUS AS NEEDED
Status: DISCONTINUED | OUTPATIENT
Start: 2023-11-08 | End: 2023-11-08

## 2023-11-08 RX ADMIN — CEFAZOLIN 1.9 G: 1 INJECTION, POWDER, FOR SOLUTION INTRAMUSCULAR; INTRAVENOUS at 13:31

## 2023-11-08 RX ADMIN — SODIUM CHLORIDE, SODIUM LACTATE, POTASSIUM CHLORIDE, AND CALCIUM CHLORIDE: .6; .31; .03; .02 INJECTION, SOLUTION INTRAVENOUS at 13:11

## 2023-11-08 RX ADMIN — LIDOCAINE HYDROCHLORIDE 50 MG: 20 INJECTION, SOLUTION INFILTRATION; PERINEURAL at 13:17

## 2023-11-08 RX ADMIN — ONDANSETRON 4 MG: 2 INJECTION INTRAMUSCULAR; INTRAVENOUS at 13:11

## 2023-11-08 RX ADMIN — PROPOFOL 100 MCG/KG/MIN: 10 INJECTION, EMULSION INTRAVENOUS at 13:20

## 2023-11-08 RX ADMIN — PROPOFOL 50 MG: 10 INJECTION, EMULSION INTRAVENOUS at 13:20

## 2023-11-08 RX ADMIN — MIDAZOLAM 2 MG: 1 INJECTION INTRAMUSCULAR; INTRAVENOUS at 13:11

## 2023-11-08 RX ADMIN — FENTANYL CITRATE 25 MCG: 50 INJECTION, SOLUTION INTRAMUSCULAR; INTRAVENOUS at 13:27

## 2023-11-08 RX ADMIN — SODIUM CHLORIDE, POTASSIUM CHLORIDE, SODIUM LACTATE AND CALCIUM CHLORIDE 100 ML/HR: 600; 310; 30; 20 INJECTION, SOLUTION INTRAVENOUS at 12:30

## 2023-11-08 RX ADMIN — CEFAZOLIN 0.1 G: 1 INJECTION, POWDER, FOR SOLUTION INTRAMUSCULAR; INTRAVENOUS at 13:20

## 2023-11-08 RX ADMIN — PROPOFOL 20 MG: 10 INJECTION, EMULSION INTRAVENOUS at 13:27

## 2023-11-08 ASSESSMENT — PAIN - FUNCTIONAL ASSESSMENT
PAIN_FUNCTIONAL_ASSESSMENT: 0-10
PAIN_FUNCTIONAL_ASSESSMENT: 0-10

## 2023-11-08 ASSESSMENT — COLUMBIA-SUICIDE SEVERITY RATING SCALE - C-SSRS
1. IN THE PAST MONTH, HAVE YOU WISHED YOU WERE DEAD OR WISHED YOU COULD GO TO SLEEP AND NOT WAKE UP?: NO
6. HAVE YOU EVER DONE ANYTHING, STARTED TO DO ANYTHING, OR PREPARED TO DO ANYTHING TO END YOUR LIFE?: NO
2. HAVE YOU ACTUALLY HAD ANY THOUGHTS OF KILLING YOURSELF?: NO

## 2023-11-08 ASSESSMENT — PAIN SCALES - GENERAL
PAINLEVEL_OUTOF10: 0 - NO PAIN
PAIN_LEVEL: 0
PAINLEVEL_OUTOF10: 0 - NO PAIN

## 2023-11-08 NOTE — ANESTHESIA PREPROCEDURE EVALUATION
Patient: Cristiana Franz    Procedure Information       Date/Time: 11/08/23 1330    Procedure: Excision Lesion Back (Back)    Location: [facility] [place] [facility] OR 01 / Virtual [facility] [place] [facility] OR    Surgeons: Evgeny Clinton MD            Patient: Cristiana Franz    Procedure Information       Date/Time: 11/08/23 1330    Procedure: Excision Lesion Back (Back)    Location: [facility] [place] [facility] OR 01 / [facility] [place] [facility] OR    Surgeons: Evgeny Clinton MD            Relevant Problems   Anesthesia   (+) PONV (postoperative nausea and vomiting)      Cardiovascular   (+) Hyperlipidemia       Clinical information reviewed:   Tobacco  Allergies  Meds   Med Hx  Surg Hx   Fam Hx  Soc Hx        NPO/Void Status  Date of Last Liquid: 11/08/23  Time of Last Liquid: 0930  Date of Last Solid: 11/07/23  Time of Last Solid: 2200           Past Medical History:   Diagnosis Date    Bladder prolapse, female, acquired     Cystocele, unspecified     Hyperlipidemia     Lipoma of skin and subcutaneous tissue of trunk     Migraine, unspecified, not intractable, without status migrainosus     PONV (postoperative nausea and vomiting)     Prolapse of anterior vaginal wall     Stress incontinence     Uterovaginal prolapse, unspecified     Vaginal atrophy     Vision loss       Past Surgical History:   Procedure Laterality Date    BLADDER SUSPENSION      COLONOSCOPY      OTHER SURGICAL HISTORY  03/03/2015    Hysteroscopy W/ Insert Of Device To Occlude Fallopian Tubes    OTHER SURGICAL HISTORY  03/19/2019    Hysterectomy supracervical     Social History     Tobacco Use    Smoking status: Never    Smokeless tobacco: Never   Vaping Use    Vaping Use: Never used   Substance Use Topics    Alcohol use: Not Currently     Comment: rare use    Drug use: Never      Current Outpatient Medications   Medication Instructions    aspirin 81 mg, oral, Daily    atorvastatin (Lipitor) 80 mg tablet TAKE 1 TABLET BY MOUTH EVERY DAY    cholecalciferol (Vitamin D-3) 50 mcg (2,000 unit) capsule 1  "capsule, oral, Daily    eletriptan (RELPAX) 20 mg, oral    estradiol (Climara) 0.0375 mg/24 hr patch 1 patch, transdermal, Weekly, apply as directed    ezetimibe (Zetia) 10 mg tablet TAKE 1 TABLET BY MOUTH EVERY DAY    traZODone (DESYREL) 50 mg, oral, Nightly PRN    Xiidra 5 % dropperette 1 drop, 2 times daily    zolpidem (Ambien) 5 mg tablet 1 tablet, oral, Nightly PRN      Allergies   Allergen Reactions    Penicillins Itching, Rash and Unknown        Chemistry    Lab Results   Component Value Date/Time     06/28/2023 1037    K 4.5 06/28/2023 1037     06/28/2023 1037    CO2 28 06/28/2023 1037    BUN 12 06/28/2023 1037    CREATININE 0.61 06/28/2023 1037    Lab Results   Component Value Date/Time    CALCIUM 9.3 06/28/2023 1037    ALKPHOS 54 06/28/2023 1037    AST 16 06/28/2023 1037    ALT 13 06/28/2023 1037    BILITOT 1.8 (H) 06/28/2023 1037          Lab Results   Component Value Date/Time    WBC 6.3 06/28/2023 1037    HGB 11.9 (L) 06/28/2023 1037    HCT 37.4 06/28/2023 1037     06/28/2023 1037     Lab Results   Component Value Date/Time    PROTIME 12.1 04/03/2018 1557    INR 1.1 04/03/2018 1557     Encounter Date: 07/12/23   ECG 12 lead (Clinic Performed)    Narrative    NSR with RSR in V1. No change from prior      No results found for this or any previous visit from the past 1095 days.       Visit Vitals  /74   Pulse 68   Temp 36.2 °C (97.2 °F) (Temporal)   Resp 16   Ht 1.575 m (5' 2\")   Wt 67.1 kg (147 lb 14.9 oz)   SpO2 98%   BMI 27.06 kg/m²   Smoking Status Never   BSA 1.71 m²        Anesthesia Evaluation      Airway   Mallampati: I  TM distance: >3 FB  Neck ROM: full  Dental      Pulmonary    Cardiovascular     Rhythm: regular  Rate: normal    Neuro/Psych      GI/Hepatic/Renal      Endo/Other    Abdominal                       Physical Exam    Airway  Mallampati: I  TM distance: >3 FB  Neck ROM: full     Cardiovascular   Rhythm: regular  Rate: normal     Dental    Pulmonary  "   Abdominal             Anesthesia Plan    ASA 2     MAC     intravenous induction   Anesthetic plan and risks discussed with patient.  Use of blood products discussed with patient who consented to blood products.    Plan discussed with CAA and attending.

## 2023-11-08 NOTE — ANESTHESIA POSTPROCEDURE EVALUATION
Patient: Cristiana Franz    Procedure Summary       Date: 11/08/23 Room / Location: [facility] [place] [place] [facility] OR 01 / Virtual [facility] [place] [place] [facility] OR    Anesthesia Start: 1311 Anesthesia Stop: 1421    Procedure: Excision Lesion Back (Back) Diagnosis:       Lipoma of back      (Lipoma of back [D17.1])    Surgeons: Evgeny Clinton MD Responsible Provider: Stefan Reed MD    Anesthesia Type: MAC ASA Status: 2            Anesthesia Type: MAC    Vitals Value Taken Time   BP 93/62 11/08/23 1423   Temp 36.2 11/08/23 1423   Pulse 63 11/08/23 1423   Resp 16 11/08/23 1423   SpO2 97 11/08/23 1423       Anesthesia Post Evaluation    Patient location during evaluation: PACU  Patient participation: complete - patient participated  Level of consciousness: awake  Pain score: 0  Pain management: adequate  Airway patency: patent  Cardiovascular status: acceptable and hemodynamically stable  Respiratory status: acceptable and room air  Hydration status: acceptable  Comments: No nausea        There were no known notable events for this encounter.

## 2023-11-08 NOTE — OP NOTE
Excision Lesion Back Operative Note     Date: 2023  OR Location: [facility] OR    Name: Cristiana Franz, : 1965, Age: 58 y.o., MRN: [id number], Sex: female    Diagnosis  Pre-op Diagnosis     * Lipoma of back [D17.1] Post-op Diagnosis     * Lipoma of back [D17.1]     Procedures  Excision Lesion Back  41313 - TX EXCISION TUMOR SOFT TIS BACK/FLANK SUBQ 3 CM/>      Surgeons      * Evgeny Clinton - Primary    Resident/Fellow/Other Assistant:  Surgeon(s) and Role: pgy 1    Procedure Summary  Anesthesia: Monitor Anesthesia Care  ASA: II  Anesthesia Staff: Anesthesiologist: Stefan Reed MD  C-AA: CASS Little  Estimated Blood Loss: 10mL  Intra-op Medications:   Medication Name Total Dose   triamcinolone acetonide (Kenalog-40) injection 40 mg              Anesthesia Record               Intraprocedure I/O Totals          Intake    Propofol Drip 0.00 mL    The total shown is the total volume documented since Anesthesia Start was filed.    Total Intake 0 mL          Specimen:   ID Type Source Tests Collected by Time   1 : BACK LIPOMA Tissue LIPOMA SURGICAL PATHOLOGY EXAM Evgeny Clinton MD 2023 [id number]        Staff:   Circulator: Marilu Staples RN  Scrub Person: Batool Hogan         Drains and/or Catheters: * None in log *    Tourniquet Times:         Implants:     Findings: Lower back lipoma 6 x 6 cm, upper back lipoma 3 x 3 cm    Indications: Cristiana Franz is an 58 y.o. female who is having surgery for Lipoma of back [D17.1].  2 specific lesions were marked on her right back    The patient was seen in the preoperative area. The risks, benefits, complications, treatment options, non-operative alternatives, expected recovery and outcomes were discussed with the patient. The possibilities of reaction to medication, pulmonary aspiration, injury to surrounding structures, bleeding, recurrent infection, the need for additional procedures, failure to diagnose a condition, and creating a complication  requiring transfusion or operation were discussed with the patient. The patient concurred with the proposed plan, giving informed consent.  The site of surgery was properly noted/marked if necessary per policy. The patient has been actively warmed in preoperative area. Preoperative antibiotics have been ordered and given within 1 hours of incision. Venous thrombosis prophylaxis have been ordered including bilateral sequential compression devices    Procedure Details: Patient consented for elective removal of right posterior back lipomatous lesions x2.  Both were marked in the preop area.  Risks and benefits have been detailed and consent was obtained    Brought to the OR placed left lateral decubitus.  We did a timeout confirm patient procedure and laterality.  Antibiotics were given IV sedation administered.  We prepped and draped sterilely.  We irma transverse lines over both lesions.  I injected local anesthetic.  We made sharp incision the skin with scalpel and then raised subcutaneous flaps with sharp dissection and cautery and then the lipomatous lesions were excised in an en bloc fashion down to the level of the fascia.  The lower 1 measured out 6 x 6 cm in the upper one 3 x 3 cm.  Wounds were irrigated and made carefully hemostatic.  Some Lizzy topical hemostatic agent was sprayed in the cavities.  We closed the deeper layers with interrupted 3-0 Vicryl.  Skin incisions were closed with running 4-0 Monocryl subcuticular sutures.  I did inject some Kenalog in the wounds to help minimize keloid formation.  Dermabond was applied along with dressings and the patient recovery in satisfactory condition      Complications:  None; patient tolerated the procedure well.    Disposition: PACU - hemodynamically stable.  Condition: stable         Additional Details: sent to path     Attending Attestation: I was present for the entire procedure.    Evgeny Clinton  Phone Number: [phone]

## 2023-11-09 ASSESSMENT — PAIN SCALES - GENERAL: PAINLEVEL_OUTOF10: 2

## 2023-11-10 ENCOUNTER — TELEPHONE (OUTPATIENT)
Dept: SURGERY | Facility: CLINIC | Age: 58
End: 2023-11-10
Payer: COMMERCIAL

## 2023-11-10 NOTE — TELEPHONE ENCOUNTER
Patient called with complaints of  low grade fever.  Inquiring if antibiotics will be needed. Advised to continue taking Tylenol as needed  and to stay hydrated.

## 2023-11-16 ENCOUNTER — APPOINTMENT (OUTPATIENT)
Dept: OBSTETRICS AND GYNECOLOGY | Facility: CLINIC | Age: 58
End: 2023-11-16
Payer: COMMERCIAL

## 2023-11-20 ENCOUNTER — OFFICE VISIT (OUTPATIENT)
Dept: OBSTETRICS AND GYNECOLOGY | Facility: CLINIC | Age: 58
End: 2023-11-20
Payer: COMMERCIAL

## 2023-11-20 VITALS
DIASTOLIC BLOOD PRESSURE: 78 MMHG | BODY MASS INDEX: 26.61 KG/M2 | HEIGHT: 62 IN | HEART RATE: 60 BPM | SYSTOLIC BLOOD PRESSURE: 110 MMHG | WEIGHT: 144.6 LBS

## 2023-11-20 DIAGNOSIS — N95.2 VAGINAL ATROPHY: ICD-10-CM

## 2023-11-20 DIAGNOSIS — N81.10 PROLAPSE OF VAGINAL WALLS: Primary | ICD-10-CM

## 2023-11-20 DIAGNOSIS — Z12.4 PAP SMEAR FOR CERVICAL CANCER SCREENING: ICD-10-CM

## 2023-11-20 LAB
LABORATORY COMMENT REPORT: NORMAL
PATH REPORT.FINAL DX SPEC: NORMAL
PATH REPORT.GROSS SPEC: NORMAL
PATH REPORT.RELEVANT HX SPEC: NORMAL
PATH REPORT.TOTAL CANCER: NORMAL
POC APPEARANCE, URINE: CLEAR
POC BILIRUBIN, URINE: NEGATIVE
POC BLOOD, URINE: NEGATIVE
POC COLOR, URINE: NORMAL
POC GLUCOSE, URINE: NEGATIVE MG/DL
POC KETONES, URINE: NEGATIVE MG/DL
POC LEUKOCYTES, URINE: NEGATIVE
POC NITRITE,URINE: NEGATIVE
POC PH, URINE: 5.5 PH
POC PROTEIN, URINE: NEGATIVE MG/DL
POC SPECIFIC GRAVITY, URINE: <=1.005
POC UROBILINOGEN, URINE: 0.2 EU/DL

## 2023-11-20 PROCEDURE — 51798 US URINE CAPACITY MEASURE: CPT | Performed by: OBSTETRICS & GYNECOLOGY

## 2023-11-20 PROCEDURE — 87624 HPV HI-RISK TYP POOLED RSLT: CPT | Performed by: OBSTETRICS & GYNECOLOGY

## 2023-11-20 PROCEDURE — 99213 OFFICE O/P EST LOW 20 MIN: CPT | Performed by: OBSTETRICS & GYNECOLOGY

## 2023-11-20 PROCEDURE — 81003 URINALYSIS AUTO W/O SCOPE: CPT | Performed by: OBSTETRICS & GYNECOLOGY

## 2023-11-20 PROCEDURE — 88175 CYTOPATH C/V AUTO FLUID REDO: CPT | Mod: TC | Performed by: OBSTETRICS & GYNECOLOGY

## 2023-11-20 PROCEDURE — 1036F TOBACCO NON-USER: CPT | Performed by: OBSTETRICS & GYNECOLOGY

## 2023-11-20 PROCEDURE — 88175 CYTOPATH C/V AUTO FLUID REDO: CPT | Mod: TC,GCY | Performed by: OBSTETRICS & GYNECOLOGY

## 2023-11-20 RX ORDER — ESTRADIOL 10 UG/1
10 INSERT VAGINAL 2 TIMES WEEKLY
Qty: 90 TABLET | Refills: 3 | Status: SHIPPED | OUTPATIENT
Start: 2023-11-20

## 2023-11-20 ASSESSMENT — ENCOUNTER SYMPTOMS
CARDIOVASCULAR NEGATIVE: 1
CONSTITUTIONAL NEGATIVE: 1
NEUROLOGICAL NEGATIVE: 1
GASTROINTESTINAL NEGATIVE: 1
MUSCULOSKELETAL NEGATIVE: 1
RESPIRATORY NEGATIVE: 1
EYES NEGATIVE: 1
ENDOCRINE NEGATIVE: 1
PSYCHIATRIC NEGATIVE: 1
FREQUENCY: 1

## 2023-11-20 ASSESSMENT — PATIENT HEALTH QUESTIONNAIRE - PHQ9
1. LITTLE INTEREST OR PLEASURE IN DOING THINGS: NOT AT ALL
SUM OF ALL RESPONSES TO PHQ9 QUESTIONS 1 AND 2: 0
2. FEELING DOWN, DEPRESSED OR HOPELESS: NOT AT ALL

## 2023-11-20 ASSESSMENT — PAIN SCALES - GENERAL: PAINLEVEL: 0-NO PAIN

## 2023-11-20 NOTE — PROGRESS NOTES
Urogynecology  Provider:  Katelyn Child MD  [phone]      ASSESSMENT AND PLAN:   57 year old female with vaginal vault prolapse and vaginal atrophy. Comorbidities include: HLD and prediabetes.     Diagnoses:   #1 Vaginal vault prolapse  #2 Cervical cancer screening  #3 Vaginal atrophy     Plan:   1. Vaginal vault prolapse  - PVR = 72mL by bladder U/S and urine dip negative today.   - 4/11/2018 surgery: RSCH, BS, robotic SCPXY, posterior colporrhaphy, and perineorrhaphy.  - Upon exam she has some mild anterior wall prolapse with Aa at 0. Sacrocolpopexy mesh is present on the anterior wall but does not feel attached to anything, no erosion. SCPXY mesh was likely cut at the time of her take back during her bowel obstruction.   - Discussed what repairing her POP would entail which includes probably just a patch on the anterior vaginal wall.   - Her POP sx are not bothersome/symptomatic and stable so she wishes to continue surveillance.     2. Vaginal atrophy, GSM  - Sent Rx of Vagifem 10mcg tablets to her preferred pharmacy with instructions to insert a tablet intravaginally 1-2x/week for management of vaginal atrophy.   - She may use olive oil, coconut oil, or OTC hyaluronic acid or Replens to improve vaginal dryness.     3. Cervical cancer screening  - Speculum exam performed and pap collected today.   - We will call the patient in 2+ weeks with the cytology report if results are abnormal and discussed that results will be uploaded to the patient portal to review after they have been resulted by the pathologist.     4. Vasomotor symptoms, HRT  - Reviewed the data that the risk of breast cancer associated with taking systemic estrogen/HRT is <1%.   - Reassured her that it is safe for her to continue using Estradiol 0.0375mg/24 hoour transdermal patches for management of her vasomotor/GSM sxs.   - She is considering cutting the patches in half and titrating down as she is not having hot flashes and is unsure  if the Estradiol is helping.     5. Health maintenance, breast cancer screening  - Unremarkable and normal breast exam today.   - Recently had x3 breast biopsies in August 2023 - pathology returned as calcium deposits from dense breast tissue.   - Advised her to follow up for routine yearly mammograms which is managed by Dr. Yo.     Follow up in 1 year with Dr. Child for a prolapse check.     Scribe Attestation  By signing my name below, I, Rafat Coleman, Angie, attest that this documentation has been prepared under the direction and in the presence of Katelyn Child MD.    Agree with above  I Dr. Child, personally performed the services described in the documentation which was scribed virtually and confirm it is both complete and accurate.  Katelyn Child MD        Problem List Items Addressed This Visit    None  Visit Diagnoses       Prolapse of vaginal walls    -  Primary    Relevant Orders    Measure post void residual (Completed)    POCT UA Automated manually resulted (Completed)    Vaginal atrophy        Relevant Medications    estradiol (Vagifem) 10 mcg tablet vaginal tablet    Pap smear for cervical cancer screening        Relevant Orders    THINPREP PAP TEST               I spent a total of eConsult Time: 25 minutes in face to face and non face to face time.        Katelyn Child MD      HISTORY OF PRESENT ILLNESS:   59yo presenting for a prolapse check.     Record Review:   - Last seen 4/7/2023 4/11/18 with Mildred  OPERATION/PROCEDURE:  1. Robotic supracervical hysterectomy.  2. Bilateral salpingectomy.  3. Robotic sacrocolpopexy.  4. Posterior colporrhaphy and perineorrhaphy.  5. Cystoscopy.      Diagnoses:   #1 Uterovaginal prolapse     Plan:   1. Uterovaginal prolapse  - Performed pelvic exam in office today without complications.  - Discussed treatment options are to monitor, surgery, pessary placement, she is minimally symptomatic and amenable to continue to monitor.    POP-Q:  patient was standing. Aa: +4. C: -9 TVL: 10 Ap: -3. D: -10.     Prolapse Symptoms:  - She reports feeling a vaginal bulge but this is not severely bothersome to her.   - Her POP does not come past the introitus and she wishes to continue surveillance.      Urinary Symptoms:   - Reports experiencing some urinary frequency.     OBGYN History:   - She has been on the Estradiol transdermal HRT patch for the past x5 years for management of menopausal vasomotor sxs. She is no longer having hot flashes but does have GSM of vaginal dryness.   - Up to date on her mammogram as of August 2023 and had to undergo x3 breast biopsies (x2 R breast and x1 L breast) and the pathology returned as calcium deposits with an overall dx of dense breast tissue.   - Due for a pap smear. Her last pap was 3/19/2019 NILM, HPV-  - Currently sexually active and denies dyspareunia. She does have some vaginal dryness and decreased sensation during intercourse.     Interval History:  - She had x2 large lipomas excised from her back that was performed by Dr. Clinton. Pathology pending.     Past Medical History:    Past Medical History:   Diagnosis Date    Bladder prolapse, female, acquired     Cystocele, unspecified     Hyperlipidemia     Lipoma of skin and subcutaneous tissue of trunk     Migraine, unspecified, not intractable, without status migrainosus     PONV (postoperative nausea and vomiting)     Prolapse of anterior vaginal wall     Stress incontinence     Uterovaginal prolapse, unspecified     Vaginal atrophy     Vision loss        Past Surgical History:     Past Surgical History:   Procedure Laterality Date    BLADDER SUSPENSION      COLONOSCOPY      OTHER SURGICAL HISTORY  03/03/2015    Hysteroscopy W/ Insert Of Device To Occlude Fallopian Tubes    OTHER SURGICAL HISTORY  03/19/2019    Hysterectomy supracervical       Medications:     Prior to Admission medications    Medication Sig Start Date End Date Taking? Authorizing Provider   aspirin  81 mg EC tablet Take 1 tablet (81 mg) by mouth once daily.    Historical Provider, MD   atorvastatin (Lipitor) 80 mg tablet TAKE 1 TABLET BY MOUTH EVERY DAY 5/29/23   Reina Yo MD   cholecalciferol (Vitamin D-3) 50 mcg (2,000 unit) capsule Take 1 capsule (2,000 Units) by mouth once daily.    Historical Provider, MD   eletriptan (Relpax) 20 mg tablet Take 1 tablet (20 mg) by mouth. 7/27/19   Historical Provider, MD   estradiol (Climara) 0.0375 mg/24 hr patch Place 1 patch on the skin 1 (one) time per week. apply as directed    Historical Provider, MD   ezetimibe (Zetia) 10 mg tablet TAKE 1 TABLET BY MOUTH EVERY DAY 4/12/23 4/11/24  Reina Yo MD   ibuprofen 600 mg tablet Take 1 tablet (600 mg) by mouth every 6 hours if needed for moderate pain (4 - 6) for up to 20 doses. 11/8/23   Evgeny Clinton MD   traZODone (Desyrel) 50 mg tablet Take 1 tablet (50 mg) by mouth as needed at bedtime for sleep. 8/31/23 8/30/24  Reina Yo MD   Xiidra 5 % dropperette 1 drop twice a day. 6/9/23 12/16/23  Historical Provider, MD   zolpidem (Ambien) 5 mg tablet Take 1 tablet (5 mg) by mouth as needed at bedtime. 11/21/16   Historical Provider, MD KINCAID  Review of Systems   Constitutional: Negative.    HENT: Negative.     Eyes: Negative.    Respiratory: Negative.     Cardiovascular: Negative.    Gastrointestinal: Negative.    Endocrine: Negative.    Genitourinary:  Positive for frequency.   Musculoskeletal: Negative.    Neurological: Negative.    Psychiatric/Behavioral: Negative.          POC Blood, Urine   Date Value Ref Range Status   11/20/2023 NEGATIVE NEGATIVE Final     Poc Nitrite, Urine   Date Value Ref Range Status   11/20/2023 NEGATIVE NEGATIVE Final     POC Urobilinogen, Urine   Date Value Ref Range Status   11/20/2023 0.2 0.2, 1.0 EU/DL Final     POC Leukocytes, Urine   Date Value Ref Range Status   11/20/2023 NEGATIVE NEGATIVE Final         PHYSICAL EXAM:    /78 (BP Location: Right arm, Patient  "Position: Sitting)   Pulse 60   Ht 1.575 m (5' 2\")   Wt 65.6 kg (144 lb 9.6 oz)   LMP  (LMP Unknown)   Breastfeeding No   BMI 26.45 kg/m²      No LMP recorded (lmp unknown). Patient has had a hysterectomy.      Declines chaperone for physical exam.    GENERAL:   Well developed, well nourished, in no apparent distress.   Neurologic/Psychiatric:  Awake, Alert and Oriented times 3.  Affect normal.   Skin: Incisions on her back from recent lipoma excision are well-healed, no induration, removed skin glue from these areas. No sign of cellulitis, induration, or surrounding erythema.   Breast: No masses bilaterally. Normal areolas, no skin dimpling, and no galactorrhea bilaterally.     GENITAL/URINARY:     External Genitalia:  The patient has normal appearing external genitalia, normal skenes and bartholins glands, and a normal hair distribution.  Her vulva is without lesions, erythema or discharge.  It is non-tender with appropriate sensation.     Urethral Meatus:  Size normal, Location normal, Lesions absent, Prolapse absent,      Urethra:  Fullness absent, Masses absent,      Bladder:  Fullness absent, Masses absent, Tenderness absent,      Vagina:  General appearance normal, Estrogen effect normal, Discharge absent, Lesions absent. Sacrocolpopexy mesh is present on the anterior wall but does not feel attached to anything, no erosion.     Cervix: Normal, no discharge. Pap collected. Bimanual exam revealed a normal cervical stump, no masses.   Uterus:  surgically absent     Anus/Perineum:  Normal Perineum    Rectal: Normal resting tone, good puborectalis lift, normal squeeze strength.     Stress urinary incontinence not demonstrable.         POP-Q:  Stage: 2  Position: supine    Aa: 0       Ba: 0 C: -9   Gh:  Pb:  TVL: 10         Ap: -3 Bp: -3 D: -10               Data and DIAGNOSTIC STUDIES REVIEWED   No results found.   No results found for: \"URINECULTURE\", \"UAMICCOMM\"   Lab Results   Component Value Date    " GLUCOSE 99 06/28/2023    CALCIUM 9.3 06/28/2023     06/28/2023    K 4.5 06/28/2023    CO2 28 06/28/2023     06/28/2023    BUN 12 06/28/2023    CREATININE 0.61 06/28/2023     Lab Results   Component Value Date    WBC 6.3 06/28/2023    HGB 11.9 (L) 06/28/2023    HCT 37.4 06/28/2023    MCV 87 06/28/2023     06/28/2023        Katelyn Child MD

## 2023-11-25 DIAGNOSIS — E78.5 HYPERLIPIDEMIA, UNSPECIFIED: ICD-10-CM

## 2023-11-25 RX ORDER — ATORVASTATIN CALCIUM 80 MG/1
80 TABLET, FILM COATED ORAL DAILY
Qty: 90 TABLET | Refills: 3 | Status: SHIPPED | OUTPATIENT
Start: 2023-11-25 | End: 2024-11-24

## 2023-11-28 ENCOUNTER — OFFICE VISIT (OUTPATIENT)
Dept: SURGERY | Facility: CLINIC | Age: 58
End: 2023-11-28
Payer: COMMERCIAL

## 2023-11-28 DIAGNOSIS — Z09 SURGERY FOLLOW-UP: Primary | ICD-10-CM

## 2023-11-28 PROCEDURE — 1036F TOBACCO NON-USER: CPT | Performed by: SURGERY

## 2023-11-28 PROCEDURE — 99024 POSTOP FOLLOW-UP VISIT: CPT | Performed by: SURGERY

## 2023-11-28 ASSESSMENT — PAIN SCALES - GENERAL: PAINLEVEL: 0-NO PAIN

## 2023-11-28 NOTE — LETTER
November 28, 2023     Reina Yo MD  [address]   [facility]  [place] [address]    Patient: Cristiana Franz   YOB: 1965   Date of Visit: 11/28/2023       Dear Dr. Reina Yo MD:    Thank you for referring Cristiana Franz to me for evaluation. Below are my notes for this consultation.  If you have questions, please do not hesitate to call me. I look forward to following your patient along with you.       Sincerely,     Evgeny Clinton MD      CC: No Recipients  ______________________________________________________________________________________    Assessment/Plan  Excellent recovery following recent excision of 2 lipomas on her back.  She is cleared to resume all activities without limitations.  Pathology report was reviewed with her.  She will follow-up with me as needed    Subjective  Status post lipoma excision.  Doing well.  No pain or drainage       Objective    Physical Exam  NAD  A&Ox3  Non icteric  CTA  RR  Abdomen soft   Wounds are well-approximated.  No signs of infection or seroma      Relevant Results      No results found for this or any previous visit (from the past 24 hour(s)).       Component  Resulting Agency   FINAL DIAGNOSIS   A. BACK, EXCISION OF MASS:    -- LIPOMA.   Electronically signed by Reva Cruz MD on 11/20/2023 at 1946      [facility]   By the signature on this report, the individual or group listed as making the Final Interpretation/Diagnosis certifies that they have reviewed this case.    Clinical History  [facility]   Pre-op diagnosis:  Lipoma of back [D17.1]            I spent 25 minutes in the professional and overall care of this patient.      Evgeny Clinton MD

## 2023-11-28 NOTE — PROGRESS NOTES
Assessment/Plan   Excellent recovery following recent excision of 2 lipomas on her back.  She is cleared to resume all activities without limitations.  Pathology report was reviewed with her.  She will follow-up with me as needed    Subjective   Status post lipoma excision.  Doing well.  No pain or drainage       Objective     Physical Exam  NAD  A&Ox3  Non icteric  CTA  RR  Abdomen soft   Wounds are well-approximated.  No signs of infection or seroma      Relevant Results      No results found for this or any previous visit (from the past 24 hour(s)).       Component  Resulting Agency   FINAL DIAGNOSIS   A. BACK, EXCISION OF MASS:    -- LIPOMA.   Electronically signed by Reva Cruz MD on 11/20/2023 at 1946      [facility]   By the signature on this report, the individual or group listed as making the Final Interpretation/Diagnosis certifies that they have reviewed this case.    Clinical History  [facility]   Pre-op diagnosis:  Lipoma of back [D17.1]            I spent 25 minutes in the professional and overall care of this patient.      Evgeny Clinton MD

## 2023-12-07 LAB
CYTOLOGY CMNT CVX/VAG CYTO-IMP: NORMAL
HPV HR 12 DNA GENITAL QL NAA+PROBE: NEGATIVE
HPV HR GENOTYPES PNL CVX NAA+PROBE: NEGATIVE
HPV16 DNA SPEC QL NAA+PROBE: NEGATIVE
HPV18 DNA SPEC QL NAA+PROBE: NEGATIVE
LAB AP HPV GENOTYPE QUESTION: YES
LAB AP HPV HR: NORMAL
LABORATORY COMMENT REPORT: NORMAL
PATH REPORT.TOTAL CANCER: NORMAL

## 2023-12-18 ENCOUNTER — ANCILLARY PROCEDURE (OUTPATIENT)
Dept: RADIOLOGY | Facility: CLINIC | Age: 58
End: 2023-12-18
Payer: COMMERCIAL

## 2023-12-18 DIAGNOSIS — M85.852 OTHER SPECIFIED DISORDERS OF BONE DENSITY AND STRUCTURE, LEFT THIGH: ICD-10-CM

## 2023-12-18 PROCEDURE — 77080 DXA BONE DENSITY AXIAL: CPT

## 2023-12-18 PROCEDURE — 77080 DXA BONE DENSITY AXIAL: CPT | Performed by: RADIOLOGY

## 2023-12-25 DIAGNOSIS — E78.5 HYPERLIPIDEMIA, UNSPECIFIED: ICD-10-CM

## 2023-12-25 RX ORDER — EZETIMIBE 10 MG/1
TABLET ORAL
Qty: 90 TABLET | Refills: 3 | Status: SHIPPED | OUTPATIENT
Start: 2023-12-25 | End: 2024-12-24

## 2023-12-28 DIAGNOSIS — Z78.0 ASYMPTOMATIC MENOPAUSAL STATE: ICD-10-CM

## 2023-12-29 RX ORDER — ESTRADIOL 0.04 MG/D
1 PATCH TRANSDERMAL
Qty: 12 PATCH | Refills: 3 | Status: SHIPPED | OUTPATIENT
Start: 2023-12-29

## 2024-05-06 DIAGNOSIS — Z12.31 ENCOUNTER FOR SCREENING MAMMOGRAM FOR MALIGNANT NEOPLASM OF BREAST: Primary | ICD-10-CM

## 2024-05-06 DIAGNOSIS — Z00.00 HEALTHCARE MAINTENANCE: ICD-10-CM

## 2024-06-17 ENCOUNTER — HOSPITAL ENCOUNTER (OUTPATIENT)
Dept: RADIOLOGY | Facility: CLINIC | Age: 59
Discharge: HOME | End: 2024-06-17
Payer: COMMERCIAL

## 2024-06-17 VITALS — HEIGHT: 62 IN | BODY MASS INDEX: 26.61 KG/M2 | WEIGHT: 144.62 LBS

## 2024-06-17 DIAGNOSIS — Z12.31 SCREENING MAMMOGRAM FOR BREAST CANCER: ICD-10-CM

## 2024-06-17 PROCEDURE — 77063 BREAST TOMOSYNTHESIS BI: CPT | Performed by: RADIOLOGY

## 2024-06-17 PROCEDURE — 77067 SCR MAMMO BI INCL CAD: CPT | Performed by: RADIOLOGY

## 2024-06-17 PROCEDURE — 77067 SCR MAMMO BI INCL CAD: CPT

## 2024-07-15 ENCOUNTER — APPOINTMENT (OUTPATIENT)
Dept: PRIMARY CARE | Facility: CLINIC | Age: 59
End: 2024-07-15
Payer: COMMERCIAL

## 2024-07-18 ENCOUNTER — LAB (OUTPATIENT)
Dept: LAB | Facility: LAB | Age: 59
End: 2024-07-18
Payer: COMMERCIAL

## 2024-07-18 DIAGNOSIS — Z00.00 HEALTHCARE MAINTENANCE: ICD-10-CM

## 2024-07-18 LAB
25(OH)D3 SERPL-MCNC: 70 NG/ML (ref 30–100)
ALBUMIN SERPL BCP-MCNC: 4.2 G/DL (ref 3.4–5)
ALP SERPL-CCNC: 54 U/L (ref 33–110)
ALT SERPL W P-5'-P-CCNC: 13 U/L (ref 7–45)
ANION GAP SERPL CALC-SCNC: 12 MMOL/L (ref 10–20)
APPEARANCE UR: CLEAR
AST SERPL W P-5'-P-CCNC: 16 U/L (ref 9–39)
BASOPHILS # BLD AUTO: 0.03 X10*3/UL (ref 0–0.1)
BASOPHILS NFR BLD AUTO: 0.5 %
BILIRUB SERPL-MCNC: 1.7 MG/DL (ref 0–1.2)
BILIRUB UR STRIP.AUTO-MCNC: NEGATIVE MG/DL
BUN SERPL-MCNC: 16 MG/DL (ref 6–23)
CALCIUM SERPL-MCNC: 9.2 MG/DL (ref 8.6–10.6)
CHLORIDE SERPL-SCNC: 104 MMOL/L (ref 98–107)
CHOLEST SERPL-MCNC: 120 MG/DL (ref 0–199)
CHOLESTEROL/HDL RATIO: 2.5
CO2 SERPL-SCNC: 29 MMOL/L (ref 21–32)
COLOR UR: NORMAL
CREAT SERPL-MCNC: 0.59 MG/DL (ref 0.5–1.05)
CRP SERPL HS-MCNC: 2.4 MG/L
EGFRCR SERPLBLD CKD-EPI 2021: >90 ML/MIN/1.73M*2
EOSINOPHIL # BLD AUTO: 0.09 X10*3/UL (ref 0–0.7)
EOSINOPHIL NFR BLD AUTO: 1.5 %
ERYTHROCYTE [DISTWIDTH] IN BLOOD BY AUTOMATED COUNT: 14.1 % (ref 11.5–14.5)
EST. AVERAGE GLUCOSE BLD GHB EST-MCNC: 123 MG/DL
GLUCOSE SERPL-MCNC: 100 MG/DL (ref 74–99)
GLUCOSE UR STRIP.AUTO-MCNC: NORMAL MG/DL
HBA1C MFR BLD: 5.9 %
HCT VFR BLD AUTO: 39.5 % (ref 36–46)
HDLC SERPL-MCNC: 47.7 MG/DL
HGB BLD-MCNC: 12.7 G/DL (ref 12–16)
HOLD SPECIMEN: NORMAL
IMM GRANULOCYTES # BLD AUTO: 0 X10*3/UL (ref 0–0.7)
IMM GRANULOCYTES NFR BLD AUTO: 0 % (ref 0–0.9)
KETONES UR STRIP.AUTO-MCNC: NEGATIVE MG/DL
LDLC SERPL CALC-MCNC: 52 MG/DL
LEUKOCYTE ESTERASE UR QL STRIP.AUTO: NEGATIVE
LYMPHOCYTES # BLD AUTO: 2.46 X10*3/UL (ref 1.2–4.8)
LYMPHOCYTES NFR BLD AUTO: 39.7 %
MCH RBC QN AUTO: 27.9 PG (ref 26–34)
MCHC RBC AUTO-ENTMCNC: 32.2 G/DL (ref 32–36)
MCV RBC AUTO: 87 FL (ref 80–100)
MONOCYTES # BLD AUTO: 0.39 X10*3/UL (ref 0.1–1)
MONOCYTES NFR BLD AUTO: 6.3 %
NEUTROPHILS # BLD AUTO: 3.23 X10*3/UL (ref 1.2–7.7)
NEUTROPHILS NFR BLD AUTO: 52 %
NITRITE UR QL STRIP.AUTO: NEGATIVE
NON HDL CHOLESTEROL: 72 MG/DL (ref 0–149)
NRBC BLD-RTO: 0 /100 WBCS (ref 0–0)
PH UR STRIP.AUTO: 6.5 [PH]
PLATELET # BLD AUTO: 302 X10*3/UL (ref 150–450)
POTASSIUM SERPL-SCNC: 4.8 MMOL/L (ref 3.5–5.3)
PROT SERPL-MCNC: 6.8 G/DL (ref 6.4–8.2)
PROT UR STRIP.AUTO-MCNC: NEGATIVE MG/DL
RBC # BLD AUTO: 4.55 X10*6/UL (ref 4–5.2)
RBC # UR STRIP.AUTO: NEGATIVE /UL
SODIUM SERPL-SCNC: 140 MMOL/L (ref 136–145)
SP GR UR STRIP.AUTO: 1.01
TRIGL SERPL-MCNC: 101 MG/DL (ref 0–149)
TSH SERPL-ACNC: 3.52 MIU/L (ref 0.44–3.98)
UROBILINOGEN UR STRIP.AUTO-MCNC: NORMAL MG/DL
VLDL: 20 MG/DL (ref 0–40)
WBC # BLD AUTO: 6.2 X10*3/UL (ref 4.4–11.3)

## 2024-07-18 PROCEDURE — 36415 COLL VENOUS BLD VENIPUNCTURE: CPT

## 2024-07-18 PROCEDURE — 81003 URINALYSIS AUTO W/O SCOPE: CPT

## 2024-07-18 PROCEDURE — 80053 COMPREHEN METABOLIC PANEL: CPT

## 2024-07-18 PROCEDURE — 80061 LIPID PANEL: CPT

## 2024-07-18 PROCEDURE — 82306 VITAMIN D 25 HYDROXY: CPT

## 2024-07-18 PROCEDURE — 86141 C-REACTIVE PROTEIN HS: CPT

## 2024-07-18 PROCEDURE — 84443 ASSAY THYROID STIM HORMONE: CPT

## 2024-07-18 PROCEDURE — 85025 COMPLETE CBC W/AUTO DIFF WBC: CPT

## 2024-07-18 PROCEDURE — 83036 HEMOGLOBIN GLYCOSYLATED A1C: CPT

## 2024-07-23 PROBLEM — N95.2 VAGINAL ATROPHY: Status: ACTIVE | Noted: 2024-07-23

## 2024-07-23 PROBLEM — H90.3 SENSORINEURAL HEARING LOSS (SNHL), BILATERAL: Status: ACTIVE | Noted: 2024-07-23

## 2024-07-23 PROBLEM — N81.9 VAGINAL VAULT PROLAPSE: Status: ACTIVE | Noted: 2023-07-11

## 2024-07-23 RX ORDER — LIFITEGRAST 50 MG/ML
1 SOLUTION/ DROPS OPHTHALMIC 2 TIMES DAILY
COMMUNITY

## 2024-07-23 NOTE — PROGRESS NOTES
Physical Exam    Name Cristiana Franz    Date of Service :7/25/2024    Cristiana Franz is a 59 y.o. year old female who is being seen for a comprehensive exam    She Has a medical history of  hypercholesteremia with elevated CT Calcium score 222.65. 6/29/2020 on Zetia, Lipitor 80 mg, asa , h/o Migraines-( was mostly Hormonal related. She hasn't had one in a while. relpax is very effective if needed )  osteopenia.   She has been on the  Estradiol patch since age 53.  s/p [place] for uterine prolapse at age 53 doing well on estrogen also had bladder lift for severe bladder prolapse at that time. complicated by obstruction from a suture. needed second surgery  and  insomnia- uses Ambien twice weekly with good results     Had elevated fasting sugars and trial of metformin which she could not tolerate due to GI upset.      Her main health concerns:   Her older sister of 6 years who was a smoker was just -diagnosed with lung cancer  - Difficulty losing weight  and Prediabetes   - Keloid scar on upper abdomen     Hemoglobin A1C  5.9 and Crp 2.4  They have built a house in [place] and is moving in 1  1/2 weeks. She pulled her back yesterday.        Patient Active Problem List   Diagnosis    Hyperlipidemia    Insomnia    Osteopenia    Vaginal vault prolapse    Annual physical exam    Decreased hearing    Pre-diabetes    Lipoma of back    PONV (postoperative nausea and vomiting)    Vaginal atrophy    Sensorineural hearing loss (SNHL), bilateral        Past Medical History:   Diagnosis Date    Bladder prolapse, female, acquired     Cystocele, unspecified     Hyperlipidemia     Lipoma of skin and subcutaneous tissue of trunk     Migraine, unspecified, not intractable, without status migrainosus     PONV (postoperative nausea and vomiting)     Prolapse of anterior vaginal wall     Stress incontinence     Uterovaginal prolapse, unspecified     Vaginal atrophy     Vision loss         Past Surgical History:   Procedure Laterality Date     BLADDER SUSPENSION      COLONOSCOPY      HYSTERECTOMY      supra cervical    LIPOMA RESECTION  11/08/2023    OTHER SURGICAL HISTORY  03/03/2015    Hysteroscopy W/ Insert Of Device To Occlude Fallopian Tubes    OTHER SURGICAL HISTORY  03/19/2019    Hysterectomy supracervical        Social History     Tobacco Use    Smoking status: Never    Smokeless tobacco: Never   Vaping Use    Vaping status: Never Used   Substance Use Topics    Alcohol use: Not Currently     Comment: rare use    Drug use: Never      Social History     Social History Narrative    , mother of 3, works as a homemaker, lives in [place], [place].     Active, walks regularly tries to do the 10,000 steps a day. life long nonsmoker, social ETOH     two daughters and a son       Diet: -  she can skip meals. Drinks lots of water. Pretzels for a snack, cookies.   Balanced dinner, protein and veggies.   Exercise: -  planning on getting a new      Family History   Problem Relation Name Age of Onset    Coronary artery disease Mother          d. 75    Kidney failure Mother      Coronary artery disease Father  60 - 69        d. 87    Parkinsonism Father      Hypertension Sister      Skin cancer Sister      No Known Problems Sister      Other (brain turmor) Father's Brother      COPD Maternal Grandmother          d. 82    Stroke Maternal Grandfather  60 - 69    No Known Problems Other            Current Outpatient Medications:     aspirin 81 mg EC tablet, Take 1 tablet (81 mg) by mouth once daily., Disp: , Rfl:     atorvastatin (Lipitor) 80 mg tablet, Take 1 tablet (80 mg) by mouth once daily., Disp: 90 tablet, Rfl: 3    cholecalciferol (Vitamin D-3) 50 mcg (2,000 unit) capsule, Take 1 capsule (50 mcg) by mouth once daily., Disp: , Rfl:     eletriptan (Relpax) 20 mg tablet, Take 1 tablet (20 mg) by mouth., Disp: , Rfl:     estradiol (Climara) 0.0375 mg/24 hr patch, APPLY 1 PATCH EVERY WEEK AS DIRECTED, Disp: 12 patch, Rfl: 3    ezetimibe (Zetia) 10  "mg tablet, TAKE 1 TABLET BY MOUTH EVERY DAY, Disp: 90 tablet, Rfl: 3    ibuprofen 600 mg tablet, Take 1 tablet (600 mg) by mouth every 6 hours if needed for moderate pain (4 - 6) for up to 20 doses., Disp: 20 tablet, Rfl: 0    traZODone (Desyrel) 50 mg tablet, Take 1 tablet (50 mg) by mouth as needed at bedtime for sleep., Disp: 90 tablet, Rfl: 3    Xiidra 5 % dropperette, Administer 1 drop into both eyes 2 times a day., Disp: , Rfl:     zolpidem (Ambien) 5 mg tablet, Take 1 tablet (5 mg) by mouth as needed at bedtime., Disp: , Rfl:     estradiol (Vagifem) 10 mcg tablet vaginal tablet, Insert 1 tablet (10 mcg) into the vagina 2 times a week., Disp: 90 tablet, Rfl: 3    Allergies   Allergen Reactions    Penicillins Itching, Rash and Unknown       Depression Screen  (Note: if answer to either of the following is \"Yes\", then a more complete depression screening is indicated)   Q1: Over the past two weeks, have you felt down, depressed or hopeless? No  Q2: Over the past two weeks, have you felt little interest or pleasure in doing things? No  No excessive anxiety     ROUTINE:     Immunizations -  should update her covid booster and flu in the fall     Mammogram: last completed  6/17/24  neg.   PAP/GYN EXAM:   11/20/23  ( Dr. Child) vaginal vault prolapse and vaginal atrophy.   3/19/2019 negative, HPV negative. next pap due 2024. cervix  still present     COLONOSCOPY: 2/25/2016 repeat 10 years 2/25/2026  DEXA:   12/18/23  lowest T score -1.3 left femoral neck   12/14/21 lowest T score -1.2 left femoral neck     OPHTHALMOLOGY: current Dr. Penn - very dry eyes. On xydra bid   Edgar Red   DERMATOLOGY  Dr. Gill         Review of Systems   Constitutional:  Negative for fatigue, fever and unexpected weight change.   HENT:  Negative for hearing loss.    Eyes:         Dry eyes    Gastrointestinal:  Negative for abdominal pain.   Genitourinary:         Vaginal dryness and prolapse    Musculoskeletal:  Positive for back pain " (Pulled her back yesterday). Negative for arthralgias.   Skin:  Negative for rash.   Psychiatric/Behavioral:  Negative for sleep disturbance.           Physical Exam  Constitutional:       General: She is not in acute distress.     Appearance: Normal appearance. She is not ill-appearing.   HENT:      Right Ear: Tympanic membrane and ear canal normal.      Left Ear: Tympanic membrane and ear canal normal.   Eyes:      Extraocular Movements: Extraocular movements intact.      Conjunctiva/sclera: Conjunctivae normal.   Cardiovascular:      Rate and Rhythm: Normal rate.      Pulses: Normal pulses.      Heart sounds: Normal heart sounds. No murmur heard.  Pulmonary:      Effort: Pulmonary effort is normal. No respiratory distress.      Breath sounds: Normal breath sounds. No wheezing, rhonchi or rales.   Chest:   Breasts:     Right: Normal. No mass, nipple discharge or skin change.      Left: Normal. No mass, nipple discharge or skin change.   Abdominal:      General: Bowel sounds are normal. There is no distension.      Palpations: There is no mass.      Tenderness: There is no abdominal tenderness. There is no guarding.   Musculoskeletal:         General: Normal range of motion.   Lymphadenopathy:      Cervical: No cervical adenopathy.      Upper Body:      Right upper body: No supraclavicular or axillary adenopathy.      Left upper body: No supraclavicular or axillary adenopathy.      Lower Body: No right inguinal adenopathy. No left inguinal adenopathy.   Skin:     General: Skin is warm and dry.      Findings: No rash.      Comments: No suspicious rashes or lesions  Keloid scar along upper middle abdomen    Neurological:      General: No focal deficit present.      Mental Status: She is alert.   Psychiatric:         Mood and Affect: Mood normal.       Patient Active Problem List   Diagnosis    Hyperlipidemia    Insomnia    Osteopenia    Vaginal vault prolapse    Annual physical exam    Decreased hearing     "Pre-diabetes    Lipoma of back    PONV (postoperative nausea and vomiting)    Vaginal atrophy    Sensorineural hearing loss (SNHL), bilateral               7/19/2023    12:19 PM 9/29/2023     1:59 PM 11/8/2023    12:24 PM 11/8/2023     2:18 PM 11/20/2023    10:55 AM 6/17/2024    10:16 AM 7/25/2024     9:03 AM   Vitals   Systolic 144  134 93 110  122   Diastolic 91  74 62 78  72   Heart Rate 58  68 68 60  65   Temp 36.4 °C (97.5 °F)  36.2 °C (97.2 °F) 36.2 °C (97.2 °F)   36.4 °C (97.6 °F)   Resp   16 16      Height (in) 1.575 m (5' 2\") 1.588 m (5' 2.5\") 1.575 m (5' 2\")  1.575 m (5' 2\") 1.575 m (5' 2.01\") 1.575 m (5' 2\")   Weight (lb)  148 147.93  144.6 144.62 142   BMI 27.07 kg/m2 26.64 kg/m2 27.06 kg/m2  26.45 kg/m2 26.44 kg/m2 25.97 kg/m2   BSA (m2) 1.71 m2 1.72 m2 1.71 m2  1.69 m2 1.69 m2 1.68 m2   Visit Report     Report  Report      RESULTS/DATA:  Reviewed Standard Labs for this physical with patient ( any significant issues addressed in A/P )     ECG: Sinus rhythm, rate of 58, RSR in V2 unchanged from prior ecg dated 7/12/23.  Normal variant ECG     Injection of Keloid Scar    Date/Time: 7/25/2024 10:47 AM    Performed by: Reina Yo MD  Authorized by: Reina Yo MD    Number of Lesions: 1  Lesion 1:     Body area: trunk    Trunk location: abdomen    Initial size (mm): 20    Malignancy: benign lesion      Destruction method comment: injection with 40 mg of Kenalog    Comments:  Keloid was cleaned with betadine and wiped with alcohol   Using  a 25 G needle 0.75 ml of Kenalog was injected.             Assessment/Plan   1. Annual physical exam  Current Health Screenings are current   Be sure to get the flu and covid boosters in the fall    We Discussed the Galleri testing for early cancer detection and information was provided   We also Discussed the benefits and potential risks  of Ozempic ( Wegovy) .  A sample was provided for you to start if you are interested    Your Fasting sugar is slightly elevated. " " This means you do have Pre- Diabetes or some insulin resistance.  In people  with \"prediabetes\", it is important to limit the content of simple sugars and refined foods in your diet. Cut out soda, juice and limit alcohol, candy, table sugar and any other source of refined sugar. In addition, you should reduce your intake of refined starches like white rice, white pasta, white potatoes, processed cereals, crackers, pretzels and snacks. Consider switching to unrefined foods such as whole grain pasta, brown rice, whole grain cereals and breads, dried beans, lentils, and split peas. These foods are good sources of dietary fiber, which help to reduce how quickly sugar from foods is sent to the bloodstream.      It is important to be active for at least 30 minutes a day - You don't have to go to the gym or break a sweat to get a benefit. Walking, gardening, and dancing are all activities that can help.    A  I recommend is Marilu Luu ( I will get you her phone number if you are interested)     Your keloid scar was injected with Kenalog today.  Keep rubbing it to try to flatten and watch for any signs of infection such as increased pain, redness or warmth and call me for any concerns      - ECG 12 lead (Clinic Performed)     Reina Yo MD             "

## 2024-07-25 ENCOUNTER — APPOINTMENT (OUTPATIENT)
Dept: PRIMARY CARE | Facility: CLINIC | Age: 59
End: 2024-07-25
Payer: COMMERCIAL

## 2024-07-25 VITALS
SYSTOLIC BLOOD PRESSURE: 122 MMHG | DIASTOLIC BLOOD PRESSURE: 72 MMHG | BODY MASS INDEX: 26.13 KG/M2 | WEIGHT: 142 LBS | OXYGEN SATURATION: 97 % | TEMPERATURE: 97.6 F | HEIGHT: 62 IN | HEART RATE: 65 BPM

## 2024-07-25 DIAGNOSIS — Z00.00 ANNUAL PHYSICAL EXAM: Primary | ICD-10-CM

## 2024-07-25 DIAGNOSIS — L91.0 KELOID SCAR: ICD-10-CM

## 2024-07-25 RX ORDER — TRIAMCINOLONE ACETONIDE 40 MG/ML
40 INJECTION, SUSPENSION INTRA-ARTICULAR; INTRAMUSCULAR ONCE
Status: COMPLETED | OUTPATIENT
Start: 2024-07-25 | End: 2024-07-25

## 2024-07-25 ASSESSMENT — ENCOUNTER SYMPTOMS
UNEXPECTED WEIGHT CHANGE: 0
FATIGUE: 0
SLEEP DISTURBANCE: 0
FEVER: 0
ABDOMINAL PAIN: 0
ARTHRALGIAS: 0
BACK PAIN: 1

## 2024-07-25 NOTE — PATIENT INSTRUCTIONS
"Current Health Screenings are current   Be sure to get the flu and covid boosters in the fall    We Discussed the Galleri testing for early cancer detection and information was provided   We also Discussed the benefits and potential risks  of Ozempic ( Wegovy) .  A sample was provided for you to start if you are interested    Your Fasting sugar is slightly elevated.  This means you do have Pre- Diabetes or some insulin resistance.  In people  with \"prediabetes\", it is important to limit the content of simple sugars and refined foods in your diet. Cut out soda, juice and limit alcohol, candy, table sugar and any other source of refined sugar. In addition, you should reduce your intake of refined starches like white rice, white pasta, white potatoes, processed cereals, crackers, pretzels and snacks. Consider switching to unrefined foods such as whole grain pasta, brown rice, whole grain cereals and breads, dried beans, lentils, and split peas. These foods are good sources of dietary fiber, which help to reduce how quickly sugar from foods is sent to the bloodstream.      It is important to be active for at least 30 minutes a day - You don't have to go to the gym or break a sweat to get a benefit. Walking, gardening, and dancing are all activities that can help.    A  I recommend is Marilu Luu ( I will get you her phone number if you are interested)     Your keloid scar was injected with Kenalog today.  Keep rubbing it to try to flatten and watch for any signs of infection such as increased pain, redness or warmth and call me for any concerns      "

## 2024-10-14 DIAGNOSIS — J40 BRONCHITIS: Primary | ICD-10-CM

## 2024-10-14 RX ORDER — DOXYCYCLINE 100 MG/1
100 CAPSULE ORAL 2 TIMES DAILY
Qty: 14 CAPSULE | Refills: 0 | Status: SHIPPED | OUTPATIENT
Start: 2024-10-14 | End: 2024-10-21

## 2024-11-12 ENCOUNTER — PATIENT MESSAGE (OUTPATIENT)
Dept: PRIMARY CARE | Facility: CLINIC | Age: 59
End: 2024-11-12
Payer: COMMERCIAL

## 2024-11-13 DIAGNOSIS — E78.5 HYPERLIPIDEMIA, UNSPECIFIED HYPERLIPIDEMIA TYPE: ICD-10-CM

## 2024-11-13 RX ORDER — SEMAGLUTIDE 0.25 MG/.5ML
0.25 INJECTION, SOLUTION SUBCUTANEOUS WEEKLY
Qty: 2 ML | Refills: 0 | Status: SHIPPED | OUTPATIENT
Start: 2024-11-13 | End: 2024-12-05

## 2024-11-15 DIAGNOSIS — R73.03 PRE-DIABETES: Primary | ICD-10-CM

## 2024-12-31 DIAGNOSIS — Z78.0 ASYMPTOMATIC MENOPAUSAL STATE: ICD-10-CM

## 2024-12-31 RX ORDER — ESTRADIOL 0.04 MG/D
1 PATCH TRANSDERMAL
Qty: 12 PATCH | Refills: 3 | Status: SHIPPED | OUTPATIENT
Start: 2024-12-31

## 2025-01-07 DIAGNOSIS — F51.01 PRIMARY INSOMNIA: ICD-10-CM

## 2025-01-07 DIAGNOSIS — E78.5 HYPERLIPIDEMIA, UNSPECIFIED: ICD-10-CM

## 2025-01-07 DIAGNOSIS — R73.03 PRE-DIABETES: ICD-10-CM

## 2025-01-07 RX ORDER — EZETIMIBE 10 MG/1
TABLET ORAL
Qty: 90 TABLET | Refills: 2 | Status: SHIPPED | OUTPATIENT
Start: 2025-01-07 | End: 2026-01-07

## 2025-01-07 RX ORDER — TRAZODONE HYDROCHLORIDE 50 MG/1
50 TABLET ORAL NIGHTLY PRN
Qty: 90 TABLET | Refills: 3 | Status: SHIPPED | OUTPATIENT
Start: 2025-01-07 | End: 2026-01-07

## 2025-01-07 RX ORDER — SEMAGLUTIDE 0.68 MG/ML
0.25 INJECTION, SOLUTION SUBCUTANEOUS
Qty: 3 ML | Refills: 0 | Status: SHIPPED | OUTPATIENT
Start: 2025-01-07

## 2025-01-19 DIAGNOSIS — E78.5 HYPERLIPIDEMIA, UNSPECIFIED: ICD-10-CM

## 2025-01-19 RX ORDER — ATORVASTATIN CALCIUM 80 MG/1
80 TABLET, FILM COATED ORAL DAILY
Qty: 90 TABLET | Refills: 3 | Status: SHIPPED | OUTPATIENT
Start: 2025-01-19 | End: 2026-01-19

## 2025-03-10 ENCOUNTER — OFFICE VISIT (OUTPATIENT)
Dept: OBSTETRICS AND GYNECOLOGY | Facility: CLINIC | Age: 60
End: 2025-03-10
Payer: COMMERCIAL

## 2025-03-10 VITALS
HEART RATE: 60 BPM | DIASTOLIC BLOOD PRESSURE: 82 MMHG | BODY MASS INDEX: 26.02 KG/M2 | WEIGHT: 141.4 LBS | SYSTOLIC BLOOD PRESSURE: 122 MMHG | HEIGHT: 62 IN

## 2025-03-10 DIAGNOSIS — N32.81 OVERACTIVE BLADDER: ICD-10-CM

## 2025-03-10 DIAGNOSIS — N95.2 VAGINAL ATROPHY: ICD-10-CM

## 2025-03-10 DIAGNOSIS — N81.11 MIDLINE CYSTOCELE: Primary | ICD-10-CM

## 2025-03-10 LAB
POC APPEARANCE, URINE: CLEAR
POC BILIRUBIN, URINE: NEGATIVE
POC BLOOD, URINE: NEGATIVE
POC COLOR, URINE: YELLOW
POC GLUCOSE, URINE: NEGATIVE MG/DL
POC KETONES, URINE: NEGATIVE MG/DL
POC LEUKOCYTES, URINE: NEGATIVE
POC NITRITE,URINE: NEGATIVE
POC PH, URINE: 6.5 PH
POC PROTEIN, URINE: NEGATIVE MG/DL
POC SPECIFIC GRAVITY, URINE: 1.01
POC UROBILINOGEN, URINE: 0.2 EU/DL

## 2025-03-10 PROCEDURE — 3008F BODY MASS INDEX DOCD: CPT | Performed by: OBSTETRICS & GYNECOLOGY

## 2025-03-10 PROCEDURE — 99214 OFFICE O/P EST MOD 30 MIN: CPT | Performed by: OBSTETRICS & GYNECOLOGY

## 2025-03-10 PROCEDURE — 99214 OFFICE O/P EST MOD 30 MIN: CPT | Mod: 25 | Performed by: OBSTETRICS & GYNECOLOGY

## 2025-03-10 PROCEDURE — 81003 URINALYSIS AUTO W/O SCOPE: CPT | Performed by: OBSTETRICS & GYNECOLOGY

## 2025-03-10 PROCEDURE — 51798 US URINE CAPACITY MEASURE: CPT | Performed by: OBSTETRICS & GYNECOLOGY

## 2025-03-10 PROCEDURE — 1036F TOBACCO NON-USER: CPT | Performed by: OBSTETRICS & GYNECOLOGY

## 2025-03-10 RX ORDER — TROSPIUM CHLORIDE 20 MG/1
TABLET, FILM COATED ORAL
Qty: 60 TABLET | Refills: 3 | Status: SHIPPED | OUTPATIENT
Start: 2025-03-10

## 2025-03-10 ASSESSMENT — ENCOUNTER SYMPTOMS
FREQUENCY: 1
ENDOCRINE NEGATIVE: 1
CONSTITUTIONAL NEGATIVE: 1
DIARRHEA: 0
CONSTIPATION: 1
NEUROLOGICAL NEGATIVE: 1
RESPIRATORY NEGATIVE: 1
MUSCULOSKELETAL NEGATIVE: 1
PSYCHIATRIC NEGATIVE: 1
CARDIOVASCULAR NEGATIVE: 1
EYES NEGATIVE: 1

## 2025-03-10 ASSESSMENT — PAIN SCALES - GENERAL: PAINLEVEL_OUTOF10: 0-NO PAIN

## 2025-03-10 NOTE — PROGRESS NOTES
"Urogynecology  Provider:  Katelyn Child MD  [phone]              ASSESSMENT AND PLAN:   59-year-old female being assessed for OAB, vaginal vault prolapse, constipation, breast marker irritation, & vaginal atrophy.    Diagnoses:  #1 Overactive bladder  #2 Vaginal vault prolapse  #3 Constipation  #4 Breast marker irritation  #5 Vaginal atrophy    Plan:  OAB  - Discussed prescribing Trospium on a PRN basis to manage urinary frequency.  - Educated her on potential side effects, including dry mouth and constipation.  - Advised her using the medication as needed, such as during travel or activities where bathroom access is limited. She is amenable to this plan.  - Rx for Trospium 20 mg tablet sent to patient's preferred pharmacy to take either a half or whole pill up to twice daily.  - Suggested she start with half a pill.    2. Vaginal vault prolapse  - POP-Q: Aa: +1, C: -9, TVL: 10, Ap: -3, D: -10  - 4/11/2018 surgery: RSCH, BS, robotic SCPXY, posterior colporrhaphy, and perineorrhaphy.  - Mild progression noted, likely related to constipation.  - Plan to monitor prolapse progression for now.   - Discussed potential surgical intervention if symptoms worsen, including the possibility of a robotic procedure with mesh placement.  - She will consider proceeding with surgery    3. Constipation  - Likely contributing to POP sxs.  - Recommended fiber supplements such as Metamucil, Benefiber, or Citrucel.  - Advised starting with a small dose of MiraLAX if needed, adjusting to avoid diarrhea.  - Encouraged the use of hot beverages in the AM to stimulate bowel movements.  - Provided constipation management \"cheat sheet.\"    4. Breast marker irritation  - No signs of infection, but irritation noted due to under wire bras.  - Suggested consulting with a dermatologist for potential steroid injection to reduce keloid formation and irritation.  - Discussed the option of marker removal if irritation persists.    5. Vaginal " atrophy  - Discussed starting Premarin cream for vaginal dryness, to be applied twice weekly.  - Educated her on proper application and timing relative to sexual activity.  - She wants to start Premarin.  - Rx for Premarin cream sent to patient's preferred pharmacy.    Agree with above. I Dr. Child, personally performed the services described in the documentation which was scribed virtually and confirm it is both complete and accurate.  Katelyn Child MD      Problem List Items Addressed This Visit    None          I spent a total of eConsult Time: 35 minutes in face to face and non face to face time.        Katelyn Child MD        HISTORY OF PRESENT ILLNESS:     Last visit 11/2023  Expand All Collapse All    Urogynecology  Provider:  Katelyn Child MD  [phone]        ASSESSMENT AND PLAN:   57 year old female with vaginal vault prolapse and vaginal atrophy. Comorbidities include: HLD and prediabetes.      Diagnoses:   #1 Vaginal vault prolapse  #2 Cervical cancer screening  #3 Vaginal atrophy      Plan:   1. Vaginal vault prolapse  - PVR = 72mL by bladder U/S and urine dip negative today.   - 4/11/2018 surgery: RSCH, BS, robotic SCPXY, posterior colporrhaphy, and perineorrhaphy.  - Upon exam she has some mild anterior wall prolapse with Aa at 0. Sacrocolpopexy mesh is present on the anterior wall but does not feel attached to anything, no erosion. SCPXY mesh was likely cut at the time of her take back during her bowel obstruction.   - Discussed what repairing her POP would entail which includes probably just a patch on the anterior vaginal wall.   - Her POP sx are not bothersome/symptomatic and stable so she wishes to continue surveillance.   - Aa 0 C -9 Tvl 10 Ap 03 D -10    2. Vaginal atrophy, GSM  - Sent Rx of Vagifem 10mcg tablets to her preferred pharmacy with instructions to insert a tablet intravaginally 1-2x/week for management of vaginal atrophy.   - She may use olive oil, coconut  oil, or OTC hyaluronic acid or Replens to improve vaginal dryness.      3. Cervical cancer screening  - Speculum exam performed and pap collected today.   - We will call the patient in 2+ weeks with the cytology report if results are abnormal and discussed that results will be uploaded to the patient portal to review after they have been resulted by the pathologist.      4. Vasomotor symptoms, HRT  - Reviewed the data that the risk of breast cancer associated with taking systemic estrogen/HRT is <1%.   - Reassured her that it is safe for her to continue using Estradiol 0.0375mg/24 hoour transdermal patches for management of her vasomotor/GSM sxs.   - She is considering cutting the patches in half and titrating down as she is not having hot flashes and is unsure if the Estradiol is helping.      5. Health maintenance, breast cancer screening  - Unremarkable and normal breast exam today.   - Recently had x3 breast biopsies in August 2023 - pathology returned as calcium deposits from dense breast tissue.   - Advised her to follow up for routine yearly mammograms which is managed by Dr. Yo.      Follow up in 1 year with Dr. Child for a prolapse check.            Record Review:     Prolapse Symptoms:  - She notices a slight bulge, which is more pronounced than before but not as severe as prior to surgery in 2018.       Urinary Symptoms:   - She reports increased urinary frequency during the daytime.  - She denies nocturia.  - She drinks a lot of fluids and feels more pressure, possibly due to stronger prolapse.  - She is not experiencing any urinary incontinence or urgency that leads to leakage.  - She is considering medication for urinary frequency but is concerned about potential side effects (dry mouth and constipation).  - She is not prone to UTIs and doesn't require ppx abx.    Bowel Symptoms:   - She experiences constipation, which may be contributing to POP.  - She has been trying probiotic gummies for  constipation.  - She has a hx of keloid formation and is concerned about potential scarring if surgical intervention is needed.  - She says constipation is a newer issue, possibly exacerbated by previous use of Ozempic, which was discontinued due to GI side effects.  - She drinks a lot of water and has tried fiber supplements previously.    Sexual Activity:   - She uses estrogen cream for vaginal dryness.    Breast Concerns:  - She has radio-opaque markers in the breasts from a previous procedure.  - She experiences irritation from bra under wires and the marker on her inferior left breast.         Past Medical History:      Past Medical History:   Diagnosis Date    Bladder prolapse, female, acquired     Cystocele, unspecified     Hyperlipidemia     Lipoma of skin and subcutaneous tissue of trunk     Migraine, unspecified, not intractable, without status migrainosus     PONV (postoperative nausea and vomiting)     Prolapse of anterior vaginal wall     Stress incontinence     Uterovaginal prolapse, unspecified     Vaginal atrophy     Vision loss           Past Surgical History:       Past Surgical History:   Procedure Laterality Date    BLADDER SUSPENSION      COLONOSCOPY      HYSTERECTOMY      supra cervical    LIPOMA RESECTION  11/08/2023    OTHER SURGICAL HISTORY  03/03/2015    Hysteroscopy W/ Insert Of Device To Occlude Fallopian Tubes    OTHER SURGICAL HISTORY  03/19/2019    Hysterectomy supracervical         Medications:       Prior to Admission medications    Medication Sig Start Date End Date Taking? Authorizing Provider   aspirin 81 mg EC tablet Take 1 tablet (81 mg) by mouth once daily.    Historical Provider, MD   atorvastatin (Lipitor) 80 mg tablet Take 1 tablet (80 mg) by mouth once daily. 1/19/25 1/19/26  Reina Yo MD   cholecalciferol (Vitamin D-3) 50 mcg (2,000 unit) capsule Take 1 capsule (50 mcg) by mouth once daily.    Historical Provider, MD   eletriptan (Relpax) 20 mg tablet Take 1 tablet  (20 mg) by mouth. 7/27/19   Historical Provider, MD   estradiol (Climara) 0.0375 mg/24 hr patch APPLY 1 PATCH EVERY WEEK AS DIRECTED 12/31/24   Reina Yo MD   ezetimibe (Zetia) 10 mg tablet TAKE 1 TABLET BY MOUTH EVERY DAY 1/7/25 1/7/26  Reina Yo MD   ibuprofen 600 mg tablet Take 1 tablet (600 mg) by mouth every 6 hours if needed for moderate pain (4 - 6) for up to 20 doses. 11/8/23   Evgeny Clinton MD   semaglutide (Ozempic) 0.25 mg or 0.5 mg (2 mg/3 mL) pen injector INJECT 0.25 MG SUBCUTANEOUSLY WEEKLY 1/7/25   Reina Yo MD   semaglutide, weight loss, (Wegovy) 0.25 mg/0.5 mL pen injector Inject 0.25 mg under the skin 1 (one) time per week for 4 doses. 11/13/24 12/5/24  Reina Yo MD   traZODone (Desyrel) 50 mg tablet TAKE 1 TABLET (50 MG) BY MOUTH AS NEEDED AT BEDTIME FOR SLEEP. 1/7/25 1/7/26  Reina Yo MD   Xiidra 5 % dropperette Administer 1 drop into both eyes 2 times a day.    Historical Provider, MD   zolpidem (Ambien) 5 mg tablet Take 1 tablet (5 mg) by mouth as needed at bedtime. 11/21/16   Historical Provider, MD KINCAID  Review of Systems   Constitutional: Negative.    HENT: Negative.     Eyes: Negative.    Respiratory: Negative.     Cardiovascular: Negative.    Gastrointestinal:  Positive for constipation. Negative for diarrhea.   Endocrine: Negative.    Genitourinary:  Positive for frequency. Negative for enuresis and urgency.   Musculoskeletal: Negative.    Neurological: Negative.    Psychiatric/Behavioral: Negative.          Blood, Urine   Date Value Ref Range Status   07/18/2024 NEGATIVE NEGATIVE Final     Poc Nitrite, Urine   Date Value Ref Range Status   11/20/2023 NEGATIVE NEGATIVE Final     Nitrite, Urine   Date Value Ref Range Status   07/18/2024 NEGATIVE NEGATIVE Final     Urobilinogen, Urine   Date Value Ref Range Status   07/18/2024 Normal Normal mg/dL Final         PHYSICAL EXAM:      LMP  (LMP Unknown)      No LMP recorded (lmp unknown). Patient is  "postmenopausal.      Declines chaperone for physical exam.      Well developed, well nourished, in no apparent distress.   Neurologic/Psychiatric:  Awake, Alert and Oriented times 3.  Affect normal.     Keloid on inferior left breast from radio opaque marker  Also has keloid at supraumbilical incision.    GENITAL/URINARY:       External Genitalia:  The patient has normal appearing external genitalia, normal skenes and bartholins glands, and a normal hair distribution.  Her vulva is without lesions, erythema or discharge.  It is non-tender with appropriate sensation.     Urethral Meatus:  Size normal, Location normal, Lesions absent, Prolapse absent,      Urethra:  Fullness absent, Masses absent,      Bladder:  Fullness absent, Masses absent, Tenderness absent,      Vagina:  General appearance normal, Estrogen effect normal, Discharge absent, Lesions absent,      Cervix: Normal, no discharge.       POP-Q:    Aa: +1       Ba:  C: -9   Gh:  Pb:  TVL: 10         Ap: -3 Bp:  D: -10               Data and DIAGNOSTIC STUDIES REVIEWED   No results found.   No results found for: \"URINECULTURE\", \"UAMICCOMM\"   Lab Results   Component Value Date    GLUCOSE 100 (H) 07/18/2024    CALCIUM 9.2 07/18/2024     07/18/2024    K 4.8 07/18/2024    CO2 29 07/18/2024     07/18/2024    BUN 16 07/18/2024    CREATININE 0.59 07/18/2024     Lab Results   Component Value Date    WBC 6.2 07/18/2024    HGB 12.7 07/18/2024    HCT 39.5 07/18/2024    MCV 87 07/18/2024     07/18/2024          Katelyn Child MD        "

## 2025-04-28 ENCOUNTER — TELEPHONE (OUTPATIENT)
Dept: OBSTETRICS AND GYNECOLOGY | Facility: CLINIC | Age: 60
End: 2025-04-28
Payer: COMMERCIAL

## 2025-04-28 DIAGNOSIS — N95.2 VAGINAL ATROPHY: ICD-10-CM

## 2025-04-28 NOTE — TELEPHONE ENCOUNTER
conjugated estrogens 0.625 mg/gram vaginal cream (Premarin)   Pharmacy said GoodRx card denied, cost with insurance is $425. Are there any other less expensive alternatives? Or any other recommendations on how to reduce the price?

## 2025-04-29 RX ORDER — ESTRADIOL 0.1 MG/G
1 CREAM VAGINAL 2 TIMES WEEKLY
Qty: 42.5 G | Refills: 3 | Status: SHIPPED | OUTPATIENT
Start: 2025-05-01

## 2025-05-15 DIAGNOSIS — Z12.31 ENCOUNTER FOR SCREENING MAMMOGRAM FOR BREAST CANCER: ICD-10-CM

## 2025-06-16 DIAGNOSIS — Z00.00 HEALTH MAINTENANCE EXAMINATION: ICD-10-CM

## 2025-06-19 ENCOUNTER — HOSPITAL ENCOUNTER (OUTPATIENT)
Dept: RADIOLOGY | Facility: CLINIC | Age: 60
Discharge: HOME | End: 2025-06-19
Payer: COMMERCIAL

## 2025-06-19 ENCOUNTER — APPOINTMENT (OUTPATIENT)
Dept: LAB | Facility: HOSPITAL | Age: 60
End: 2025-06-19
Payer: COMMERCIAL

## 2025-06-19 DIAGNOSIS — Z00.00 ENCOUNTER FOR GENERAL ADULT MEDICAL EXAMINATION WITHOUT ABNORMAL FINDINGS: Primary | ICD-10-CM

## 2025-06-19 DIAGNOSIS — Z12.31 ENCOUNTER FOR SCREENING MAMMOGRAM FOR BREAST CANCER: ICD-10-CM

## 2025-06-19 LAB
25(OH)D3 SERPL-MCNC: 68 NG/ML (ref 30–100)
ALBUMIN SERPL BCP-MCNC: 4.4 G/DL (ref 3.4–5)
ALP SERPL-CCNC: 58 U/L (ref 33–136)
ALT SERPL W P-5'-P-CCNC: 19 U/L (ref 7–45)
ANION GAP SERPL CALC-SCNC: 15 MMOL/L (ref 10–20)
APPEARANCE UR: CLEAR
AST SERPL W P-5'-P-CCNC: 18 U/L (ref 9–39)
BASOPHILS # BLD AUTO: 0.04 X10*3/UL (ref 0–0.1)
BASOPHILS NFR BLD AUTO: 0.7 %
BILIRUB SERPL-MCNC: 2.4 MG/DL (ref 0–1.2)
BILIRUB UR STRIP.AUTO-MCNC: NEGATIVE MG/DL
BUN SERPL-MCNC: 11 MG/DL (ref 6–23)
CALCIUM SERPL-MCNC: 9.3 MG/DL (ref 8.6–10.3)
CHLORIDE SERPL-SCNC: 103 MMOL/L (ref 98–107)
CHOLEST SERPL-MCNC: 116 MG/DL (ref 0–199)
CHOLESTEROL/HDL RATIO: 2.1
CO2 SERPL-SCNC: 25 MMOL/L (ref 21–32)
COLOR UR: COLORLESS
CREAT SERPL-MCNC: 0.57 MG/DL (ref 0.5–1.05)
CRP SERPL HS-MCNC: 2.6 MG/L
EGFRCR SERPLBLD CKD-EPI 2021: >90 ML/MIN/1.73M*2
EOSINOPHIL # BLD AUTO: 0.04 X10*3/UL (ref 0–0.7)
EOSINOPHIL NFR BLD AUTO: 0.7 %
ERYTHROCYTE [DISTWIDTH] IN BLOOD BY AUTOMATED COUNT: 14.2 % (ref 11.5–14.5)
EST. AVERAGE GLUCOSE BLD GHB EST-MCNC: 123 MG/DL
GLUCOSE SERPL-MCNC: 97 MG/DL (ref 74–99)
GLUCOSE UR STRIP.AUTO-MCNC: NORMAL MG/DL
HBA1C MFR BLD: 5.9 % (ref ?–5.7)
HCT VFR BLD AUTO: 39.6 % (ref 36–46)
HCV AB SER QL: NONREACTIVE
HDLC SERPL-MCNC: 54.1 MG/DL
HGB BLD-MCNC: 13 G/DL (ref 12–16)
HOLD SPECIMEN: NORMAL
IMM GRANULOCYTES # BLD AUTO: 0.01 X10*3/UL (ref 0–0.7)
IMM GRANULOCYTES NFR BLD AUTO: 0.2 % (ref 0–0.9)
KETONES UR STRIP.AUTO-MCNC: NEGATIVE MG/DL
LDLC SERPL CALC-MCNC: 47 MG/DL
LEUKOCYTE ESTERASE UR QL STRIP.AUTO: NEGATIVE
LYMPHOCYTES # BLD AUTO: 2.01 X10*3/UL (ref 1.2–4.8)
LYMPHOCYTES NFR BLD AUTO: 33 %
MCH RBC QN AUTO: 29 PG (ref 26–34)
MCHC RBC AUTO-ENTMCNC: 32.8 G/DL (ref 32–36)
MCV RBC AUTO: 88 FL (ref 80–100)
MONOCYTES # BLD AUTO: 0.44 X10*3/UL (ref 0.1–1)
MONOCYTES NFR BLD AUTO: 7.2 %
NEUTROPHILS # BLD AUTO: 3.56 X10*3/UL (ref 1.2–7.7)
NEUTROPHILS NFR BLD AUTO: 58.2 %
NITRITE UR QL STRIP.AUTO: NEGATIVE
NON HDL CHOLESTEROL: 62 MG/DL (ref 0–149)
NRBC BLD-RTO: 0 /100 WBCS (ref 0–0)
PH UR STRIP.AUTO: 6.5 [PH]
PLATELET # BLD AUTO: 323 X10*3/UL (ref 150–450)
POTASSIUM SERPL-SCNC: 4.9 MMOL/L (ref 3.5–5.3)
PROT SERPL-MCNC: 6.8 G/DL (ref 6.4–8.2)
PROT UR STRIP.AUTO-MCNC: NEGATIVE MG/DL
RBC # BLD AUTO: 4.48 X10*6/UL (ref 4–5.2)
RBC # UR STRIP.AUTO: NEGATIVE MG/DL
SODIUM SERPL-SCNC: 138 MMOL/L (ref 136–145)
SP GR UR STRIP.AUTO: 1
TRIGL SERPL-MCNC: 77 MG/DL (ref 0–149)
TSH SERPL-ACNC: 1.99 MIU/L (ref 0.44–3.98)
UROBILINOGEN UR STRIP.AUTO-MCNC: NORMAL MG/DL
VLDL: 15 MG/DL (ref 0–40)
WBC # BLD AUTO: 6.1 X10*3/UL (ref 4.4–11.3)

## 2025-06-19 PROCEDURE — 77066 DX MAMMO INCL CAD BI: CPT | Performed by: STUDENT IN AN ORGANIZED HEALTH CARE EDUCATION/TRAINING PROGRAM

## 2025-06-19 PROCEDURE — 80053 COMPREHEN METABOLIC PANEL: CPT

## 2025-06-19 PROCEDURE — 86803 HEPATITIS C AB TEST: CPT

## 2025-06-19 PROCEDURE — 76983 USE EA ADDL TARGET LESION: CPT

## 2025-06-19 PROCEDURE — 83036 HEMOGLOBIN GLYCOSYLATED A1C: CPT

## 2025-06-19 PROCEDURE — 82306 VITAMIN D 25 HYDROXY: CPT

## 2025-06-19 PROCEDURE — 77062 BREAST TOMOSYNTHESIS BI: CPT | Performed by: STUDENT IN AN ORGANIZED HEALTH CARE EDUCATION/TRAINING PROGRAM

## 2025-06-19 PROCEDURE — 80061 LIPID PANEL: CPT

## 2025-06-19 PROCEDURE — 81003 URINALYSIS AUTO W/O SCOPE: CPT

## 2025-06-19 PROCEDURE — 85025 COMPLETE CBC W/AUTO DIFF WBC: CPT

## 2025-06-19 PROCEDURE — 77066 DX MAMMO INCL CAD BI: CPT

## 2025-06-19 PROCEDURE — 76642 ULTRASOUND BREAST LIMITED: CPT

## 2025-06-19 PROCEDURE — 76642 ULTRASOUND BREAST LIMITED: CPT | Performed by: STUDENT IN AN ORGANIZED HEALTH CARE EDUCATION/TRAINING PROGRAM

## 2025-06-19 PROCEDURE — 84443 ASSAY THYROID STIM HORMONE: CPT

## 2025-06-19 PROCEDURE — 86141 C-REACTIVE PROTEIN HS: CPT

## 2025-07-08 NOTE — PROGRESS NOTES
Physical Exam    Name Cristiana Franz    Date of Service :7/9/2025      Cristiana Franz is a 60 y.o. year old female who is being seen for a comprehensive exam    OAB  Constipation  hypercholesteremia  elevated CT Calcium score 222.65. 6/29/2020   -  Zetia, Lipitor 80 mg, asa ,   h/o Migraines-  - osteopenia.   S   insomnia- uses Ambien twice weekly with good results      Had elevated fasting sugars and trial of metformin which she could not tolerate due to GI upset.  And tried ozempic but had too much GI side effects with that as well.        Her older sister of 6 years was diagnosed with lung cancer last year    he has been on the  Estradiol patch since age 53.  s/p CHARLOTTE for uterine prolapse at age 53 doing well on estrogen also had bladder lift for severe bladder prolapse at that time. complicated by obstruction from a suture. needed second surgery  and     Her main health concerns today :    Dyspnea with hiking and her cardiovascular health     Problem List[1]     Medical History[2]     Surgical History[3]     Social History[4]   Social History     Social History Narrative    , mother of 3, works as a homemaker, lives in [place], [place].     Active, walks regularly tries to do the 10,000 steps a day. life long nonsmoker, social ETOH     two daughters and a son     Alcohol: social   Tobacco:  lifelong   Diet: balanced   Exercise:   swimming laps, walking more     Family History[5]     Current Medications[6]    Allergies[7]      ROUTINE:     Immunizations due for pneumonia vaccine   , will check measles immunity at next blood draw       Mammogram: last completed 6/19/25 with ultrasound   GYN EXAM: 3/10/25  Dr. Child  COLONOSCOPY: 2/25/2016 repeat 10 years 2/25/2026     DEXA:  on vitamin D no treatment   12/18/23  lowest T score -1.3 left femoral neck   12/14/21 lowest T score -1.2 left femoral neck      OPHTHALMOLOGY: current Dr. Penn - very dry eyes. On xydra bid   Edgar Red   DERMATOLOGY-  Dr. Gill  "      Review of Systems     /66 (BP Location: Left arm, Patient Position: Sitting)   Pulse 60   Temp 36.5 °C (97.7 °F)   Ht 1.575 m (5' 2\")   Wt 65.5 kg (144 lb 6 oz)   LMP  (LMP Unknown)   SpO2 98%   BMI 26.41 kg/m²  Body mass index is 26.41 kg/m².    Physical Exam  Vitals reviewed.   Constitutional:       General: She is not in acute distress.     Appearance: Normal appearance.   HENT:      Right Ear: Tympanic membrane and external ear normal.      Left Ear: Tympanic membrane and external ear normal.   Eyes:      Extraocular Movements: Extraocular movements intact.      Conjunctiva/sclera: Conjunctivae normal.   Neck:      Thyroid: No thyroid mass, thyromegaly or thyroid tenderness.      Vascular: No carotid bruit.   Cardiovascular:      Rate and Rhythm: Normal rate and regular rhythm.      Pulses: Normal pulses.      Heart sounds: Normal heart sounds. No murmur heard.     No gallop.   Pulmonary:      Effort: Pulmonary effort is normal. No respiratory distress.      Breath sounds: Normal breath sounds. No wheezing, rhonchi or rales.   Abdominal:      General: Bowel sounds are normal. There is no distension.      Palpations: There is no mass.      Tenderness: There is no abdominal tenderness. There is no guarding. Negative signs include psoas sign.   Musculoskeletal:         General: No swelling or tenderness. Normal range of motion.      Cervical back: Neck supple.      Right lower leg: No edema.      Left lower leg: No edema.   Lymphadenopathy:      Head:      Right side of head: No submandibular adenopathy.      Left side of head: No submandibular adenopathy.      Cervical: No cervical adenopathy.      Upper Body:      Right upper body: No supraclavicular adenopathy.      Left upper body: No supraclavicular adenopathy.      Lower Body: No right inguinal adenopathy. No left inguinal adenopathy.   Skin:     General: Skin is warm and dry.   Neurological:      General: No focal deficit present.      " Mental Status: She is alert.      Motor: Motor function is intact.      Coordination: Coordination is intact.      Gait: Gait normal.   Psychiatric:         Mood and Affect: Mood normal.         Speech: Speech normal.         Thought Content: Thought content normal.         RESULTS/DATA:  Reviewed Standard Labs for this physical with patient ( any significant issues addressed in A/P )     ECG: . Sinus rate of 54, low voltage ecg, normal variant     Component      Latest Ref Rng 6/19/2025   WBC      4.4 - 11.3 x10*3/uL 6.1    nRBC      0.0 - 0.0 /100 WBCs 0.0    RBC      4.00 - 5.20 x10*6/uL 4.48    HEMOGLOBIN      12.0 - 16.0 g/dL 13.0    HEMATOCRIT      36.0 - 46.0 % 39.6    MCV      80 - 100 fL 88    MCH      26.0 - 34.0 pg 29.0    MCHC      32.0 - 36.0 g/dL 32.8    RED CELL DISTRIBUTION WIDTH      11.5 - 14.5 % 14.2    Platelets      150 - 450 x10*3/uL 323    Neutrophils %      40.0 - 80.0 % 58.2    Immature Granulocytes %, Automated      0.0 - 0.9 % 0.2    Lymphocytes %      13.0 - 44.0 % 33.0    Monocytes %      2.0 - 10.0 % 7.2    Eosinophils %      0.0 - 6.0 % 0.7    Basophils %      0.0 - 2.0 % 0.7    Neutrophils Absolute      1.20 - 7.70 x10*3/uL 3.56    Immature Granulocytes Absolute, Automated      0.00 - 0.70 x10*3/uL 0.01    Lymphocytes Absolute      1.20 - 4.80 x10*3/uL 2.01    Monocytes Absolute      0.10 - 1.00 x10*3/uL 0.44    Eosinophils Absolute      0.00 - 0.70 x10*3/uL 0.04    Basophils Absolute      0.00 - 0.10 x10*3/uL 0.04    GLUCOSE      74 - 99 mg/dL 97    SODIUM      136 - 145 mmol/L 138    POTASSIUM      3.5 - 5.3 mmol/L 4.9    CHLORIDE      98 - 107 mmol/L 103    Bicarbonate      21 - 32 mmol/L 25    Anion Gap      10 - 20 mmol/L 15    Blood Urea Nitrogen      6 - 23 mg/dL 11    Creatinine      0.50 - 1.05 mg/dL 0.57    EGFR      >60 mL/min/1.73m*2 >90    Calcium      8.6 - 10.3 mg/dL 9.3    Albumin      3.4 - 5.0 g/dL 4.4    Alkaline Phosphatase      33 - 136 U/L 58    Total Protein       6.4 - 8.2 g/dL 6.8    AST      9 - 39 U/L 18    Bilirubin Total      0.0 - 1.2 mg/dL 2.4 (H)    ALT      7 - 45 U/L 19    Color, Urine      Light-Yellow, Yellow, Dark-Yellow  Colorless ! (N)    Appearance, Urine      Clear  Clear    Specific Gravity, Urine      1.005 - 1.035  1.002 ! (N)    pH, Urine      5.0, 5.5, 6.0, 6.5, 7.0, 7.5, 8.0  6.5    Protein, Urine      NEGATIVE, 10 (TRACE), 20 (TRACE) mg/dL NEGATIVE    Glucose, Urine      Normal mg/dL Normal    Blood, Urine      NEGATIVE mg/dL NEGATIVE    Ketones, Urine      NEGATIVE mg/dL NEGATIVE    Bilirubin, Urine      NEGATIVE mg/dL NEGATIVE    Urobilinogen, Urine      Normal mg/dL Normal    Nitrite, Urine      NEGATIVE  NEGATIVE    Leukocyte Esterase, Urine      NEGATIVE  NEGATIVE    CHOLESTEROL      0 - 199 mg/dL 116    HDL CHOLESTEROL      mg/dL 54.1    Cholesterol/HDL Ratio 2.1    LDL Calculated      <=99 mg/dL 47    VLDL      0 - 40 mg/dL 15    TRIGLYCERIDES      0 - 149 mg/dL 77    Non HDL Cholesterol      0 - 149 mg/dL 62    Hemoglobin A1C      See comment % 5.9 (H)    Estimated Average Glucose      Not Established mg/dL 123    Thyroid Stimulating Hormone      0.44 - 3.98 mIU/L 1.99    CRP, High Sensitivity      <1.0 mg/L 2.6 (H)    Vitamin D, 25-Hydroxy, Total      30 - 100 ng/mL 68    Hepatitis C AB      Nonreactive  Nonreactive       Legend:  (H) High  ! (N) Normal  Assessment/Plan   1. Primary insomnia  Doing will with 1/2 trazodone.  She only takes ambien infrequently with airline travel   - traZODone (Desyrel) 50 mg tablet; Take 0.5 tablets (25 mg) by mouth as needed at bedtime for sleep.  Dispense: 90 tablet; Refill: 3    2. Dyspnea on exertion  Family history of Cardiovascular disease  Known atherosclerosis   - Echocardiogram Stress Test; Future  - CT cardiac scoring wo IV contrast; Future    3. Atherosclerosis of coronary artery, unspecified vessel or lesion type, unspecified whether angina present, unspecified whether native or transplanted  heart  At goal on lipitor, zetia, aspirin   Will check   - Echocardiogram Stress Test; Future  - CT cardiac scoring wo IV contrast; Future    4. Pre-diabetes  Discussed relationship with insulin resistance, inflammation and cardiovascular disease   - metFORMIN (Glucophage) 500 mg tablet; Take 1 tablet (500 mg) by mouth 2 times a day.  Dispense: 60 tablet; Refill: 0    5. Annual physical exam (Primary)  - XR DEXA bone density; Future  - ECG 12 lead (Clinic Performed)   - Colonoscopy Screening; Average Risk Patient; Future Due in Feb. 2026. Orders placed today   - updated pneumonia vaccine today     Follow up as needed for any acute issues and then again in a year       Reina Yo MD                    [1]   Patient Active Problem List  Diagnosis    Hyperlipidemia    Insomnia    Osteopenia    Vaginal vault prolapse    Annual physical exam    Decreased hearing    Pre-diabetes    Lipoma of back    PONV (postoperative nausea and vomiting)    Vaginal atrophy    Sensorineural hearing loss (SNHL), bilateral    BMI 25.0-25.9,adult   [2]   Past Medical History:  Diagnosis Date    Bladder prolapse, female, acquired     Cystocele, unspecified     Hyperlipidemia     Lipoma of skin and subcutaneous tissue of trunk     Migraine, unspecified, not intractable, without status migrainosus     PONV (postoperative nausea and vomiting)     Prolapse of anterior vaginal wall     Stress incontinence     Uterovaginal prolapse, unspecified     Vaginal atrophy     Vision loss    [3]   Past Surgical History:  Procedure Laterality Date    BLADDER SUSPENSION      COLONOSCOPY      HYSTERECTOMY      supra cervical    LIPOMA RESECTION  11/08/2023    OTHER SURGICAL HISTORY  03/03/2015    Hysteroscopy W/ Insert Of Device To Occlude Fallopian Tubes    OTHER SURGICAL HISTORY  03/19/2019    Hysterectomy supracervical   [4]   Social History  Tobacco Use    Smoking status: Never    Smokeless tobacco: Never   Vaping Use    Vaping status: Never Used    Substance Use Topics    Alcohol use: Not Currently     Comment: rare use    Drug use: Never   [5]   Family History  Problem Relation Name Age of Onset    Coronary artery disease Mother          d. 75    Kidney failure Mother      Coronary artery disease Father  60 - 69        d. 87    Parkinsonism Father      Hypertension Sister Bia         alive at 65  6 years older    Skin cancer Sister Bia     Lung cancer Sister Bia 64    No Known Problems Sister Ayla     Other (brain turmor) Father's Brother      COPD Maternal Grandmother          d. 82    Stroke Maternal Grandfather  60 - 69    No Known Problems Other     [6]   Current Outpatient Medications:     aspirin 81 mg EC tablet, Take 1 tablet (81 mg) by mouth once daily., Disp: , Rfl:     atorvastatin (Lipitor) 80 mg tablet, Take 1 tablet (80 mg) by mouth once daily., Disp: 90 tablet, Rfl: 3    cholecalciferol (Vitamin D-3) 50 mcg (2,000 unit) capsule, Take 1 capsule (50 mcg) by mouth once daily., Disp: , Rfl:     estradiol (Climara) 0.0375 mg/24 hr patch, APPLY 1 PATCH EVERY WEEK AS DIRECTED, Disp: 12 patch, Rfl: 3    estradiol (Estrace) 0.01 % (0.1 mg/gram) vaginal cream, Insert 0.25 Applicatorfuls (1 g) into the vagina 2 times a week., Disp: 42.5 g, Rfl: 3    ezetimibe (Zetia) 10 mg tablet, TAKE 1 TABLET BY MOUTH EVERY DAY, Disp: 90 tablet, Rfl: 2    ibuprofen 600 mg tablet, Take 1 tablet (600 mg) by mouth every 6 hours if needed for moderate pain (4 - 6) for up to 20 doses., Disp: 20 tablet, Rfl: 0    Xiidra 5 % dropperette, Administer 1 drop into both eyes 2 times a day., Disp: , Rfl:     metFORMIN (Glucophage) 500 mg tablet, Take 1 tablet (500 mg) by mouth 2 times a day., Disp: 60 tablet, Rfl: 0    traZODone (Desyrel) 50 mg tablet, Take 0.5 tablets (25 mg) by mouth as needed at bedtime for sleep., Disp: 90 tablet, Rfl: 3    trospium (Sanctura) 20 mg tablet, 1/2 to full tab twice daily for urinary frequency as needed (Patient not taking: Reported on  7/9/2025), Disp: 60 tablet, Rfl: 3  [7]   Allergies  Allergen Reactions    Penicillins Itching, Rash and Unknown

## 2025-07-09 ENCOUNTER — APPOINTMENT (OUTPATIENT)
Dept: PRIMARY CARE | Facility: CLINIC | Age: 60
End: 2025-07-09
Payer: COMMERCIAL

## 2025-07-09 VITALS
OXYGEN SATURATION: 98 % | SYSTOLIC BLOOD PRESSURE: 111 MMHG | HEIGHT: 62 IN | WEIGHT: 144.38 LBS | BODY MASS INDEX: 26.57 KG/M2 | HEART RATE: 60 BPM | DIASTOLIC BLOOD PRESSURE: 66 MMHG | TEMPERATURE: 97.7 F

## 2025-07-09 DIAGNOSIS — F51.01 PRIMARY INSOMNIA: ICD-10-CM

## 2025-07-09 DIAGNOSIS — Z00.00 ANNUAL PHYSICAL EXAM: Primary | ICD-10-CM

## 2025-07-09 DIAGNOSIS — Z23 ENCOUNTER FOR PREVNAR PNEUMOCOCCAL VACCINATION: ICD-10-CM

## 2025-07-09 DIAGNOSIS — I25.10 ATHEROSCLEROSIS OF CORONARY ARTERY, UNSPECIFIED VESSEL OR LESION TYPE, UNSPECIFIED WHETHER ANGINA PRESENT, UNSPECIFIED WHETHER NATIVE OR TRANSPLANTED HEART: ICD-10-CM

## 2025-07-09 DIAGNOSIS — R73.03 PRE-DIABETES: ICD-10-CM

## 2025-07-09 DIAGNOSIS — Z12.11 COLON CANCER SCREENING: ICD-10-CM

## 2025-07-09 DIAGNOSIS — Z78.0 ASYMPTOMATIC MENOPAUSE: ICD-10-CM

## 2025-07-09 DIAGNOSIS — R06.09 DYSPNEA ON EXERTION: ICD-10-CM

## 2025-07-09 PROCEDURE — 93000 ELECTROCARDIOGRAM COMPLETE: CPT | Performed by: INTERNAL MEDICINE

## 2025-07-09 PROCEDURE — UHSPHYS PR UH SELECT PHYSICAL: Performed by: INTERNAL MEDICINE

## 2025-07-09 PROCEDURE — 90471 IMMUNIZATION ADMIN: CPT | Performed by: INTERNAL MEDICINE

## 2025-07-09 PROCEDURE — 3008F BODY MASS INDEX DOCD: CPT | Performed by: INTERNAL MEDICINE

## 2025-07-09 PROCEDURE — 90677 PCV20 VACCINE IM: CPT | Performed by: INTERNAL MEDICINE

## 2025-07-09 RX ORDER — METFORMIN HYDROCHLORIDE 500 MG/1
500 TABLET ORAL 2 TIMES DAILY
Qty: 60 TABLET | Refills: 0 | Status: SHIPPED | OUTPATIENT
Start: 2025-07-09 | End: 2025-08-08

## 2025-07-09 RX ORDER — TRAZODONE HYDROCHLORIDE 50 MG/1
25 TABLET ORAL NIGHTLY PRN
Qty: 90 TABLET | Refills: 3 | Status: SHIPPED | OUTPATIENT
Start: 2025-07-09 | End: 2026-07-09

## 2025-07-09 NOTE — PATIENT INSTRUCTIONS
We discussed getting a stress test this year to further evaluate your shortness of breath with exertion  We will also get a cardiac calcium score   You are at goal with your cholesterol and blood pressure     You do have some insulin resistance and prediabetes which is an inflammatory condition and getting on metformin will improve your cardiovascular profile as we improved your insulin resistance. Take after a meal at night and then go to twice a day.       Routine   Galleri  test discussed and ordered   Bone Density due after Dec, 18th   Colonoscopy due after 2/25/2026   Pneumonia vaccine done today

## 2025-07-17 ENCOUNTER — TELEPHONE (OUTPATIENT)
Dept: CARDIOLOGY | Facility: HOSPITAL | Age: 60
End: 2025-07-17

## 2025-07-18 ENCOUNTER — HOSPITAL ENCOUNTER (OUTPATIENT)
Dept: CARDIOLOGY | Facility: HOSPITAL | Age: 60
Discharge: HOME | End: 2025-07-18
Payer: COMMERCIAL

## 2025-07-18 ENCOUNTER — HOSPITAL ENCOUNTER (OUTPATIENT)
Dept: RADIOLOGY | Facility: HOSPITAL | Age: 60
Discharge: HOME | End: 2025-07-18
Payer: COMMERCIAL

## 2025-07-18 DIAGNOSIS — R06.09 DYSPNEA ON EXERTION: ICD-10-CM

## 2025-07-18 DIAGNOSIS — I25.10 ATHEROSCLEROSIS OF CORONARY ARTERY, UNSPECIFIED VESSEL OR LESION TYPE, UNSPECIFIED WHETHER ANGINA PRESENT, UNSPECIFIED WHETHER NATIVE OR TRANSPLANTED HEART: ICD-10-CM

## 2025-07-18 PROCEDURE — 93018 CV STRESS TEST I&R ONLY: CPT

## 2025-07-18 PROCEDURE — 93325 DOPPLER ECHO COLOR FLOW MAPG: CPT

## 2025-07-18 PROCEDURE — C8928 TTE W OR W/O FOL W/CON,STRES: HCPCS

## 2025-07-18 PROCEDURE — 93321 DOPPLER ECHO F-UP/LMTD STD: CPT

## 2025-07-18 PROCEDURE — 93350 STRESS TTE ONLY: CPT

## 2025-07-18 PROCEDURE — 75571 CT HRT W/O DYE W/CA TEST: CPT

## 2025-07-18 PROCEDURE — 93016 CV STRESS TEST SUPVJ ONLY: CPT

## 2025-07-31 DIAGNOSIS — R73.03 PRE-DIABETES: ICD-10-CM

## 2025-07-31 RX ORDER — METFORMIN HYDROCHLORIDE 500 MG/1
500 TABLET ORAL 2 TIMES DAILY
Qty: 180 TABLET | Refills: 1 | Status: SHIPPED | OUTPATIENT
Start: 2025-07-31

## 2025-08-10 DIAGNOSIS — M79.672 LEFT FOOT PAIN: Primary | ICD-10-CM

## 2025-08-11 ENCOUNTER — HOSPITAL ENCOUNTER (OUTPATIENT)
Dept: RADIOLOGY | Facility: CLINIC | Age: 60
Discharge: HOME | End: 2025-08-11
Payer: COMMERCIAL

## 2025-08-11 DIAGNOSIS — M79.672 LEFT FOOT PAIN: ICD-10-CM

## 2025-08-11 PROCEDURE — 73630 X-RAY EXAM OF FOOT: CPT | Mod: LT

## 2025-08-11 PROCEDURE — 73630 X-RAY EXAM OF FOOT: CPT | Mod: LEFT SIDE | Performed by: RADIOLOGY

## (undated) DEVICE — SYRINGE, 60 CC, IRRIGATION, BULB, CONTRO-BULB, PAPER POUCH

## (undated) DEVICE — DRAPE, SHEET, LAPAROTOMY, TRANSVERSE, W/FENESTRATION, 77 X 122 IN, DISPOSABLE, LF, STERILE

## (undated) DEVICE — NEEDLE, HYPODERMIC, 25 G X 1.5 IN, A BEVEL, STERILE

## (undated) DEVICE — STRIP, SKIN CLOSURE, STERI STRIP, REINFORCED, 0.5 X 4 IN

## (undated) DEVICE — HEMOSTAT, ARISTA, ABSORBABLE, 3 GRAMS

## (undated) DEVICE — ELECTRODE, ELECTROSURGICAL, BLADE, INSULATED, ENT/IMA, STERILE

## (undated) DEVICE — TOWEL, SURGICAL, NEURO, O/R, 16 X 26, BLUE, STERILE

## (undated) DEVICE — PACK, BASIC

## (undated) DEVICE — Device

## (undated) DEVICE — SUTURE, POLYSORB, 3-0, 30 IN, V20, UNDYED

## (undated) DEVICE — SUCTION TIP , YANKAUER, W/BULB SUCTION

## (undated) DEVICE — SUTURE, MONOCRYL, 4-0, 18 IN, PS2, UNDYED